# Patient Record
Sex: MALE | Race: WHITE | NOT HISPANIC OR LATINO | URBAN - METROPOLITAN AREA
[De-identification: names, ages, dates, MRNs, and addresses within clinical notes are randomized per-mention and may not be internally consistent; named-entity substitution may affect disease eponyms.]

---

## 2017-02-17 ENCOUNTER — OUTPATIENT (OUTPATIENT)
Dept: OUTPATIENT SERVICES | Facility: HOSPITAL | Age: 32
LOS: 1 days | Discharge: HOME | End: 2017-02-17

## 2017-06-22 ENCOUNTER — OUTPATIENT (OUTPATIENT)
Dept: OUTPATIENT SERVICES | Facility: HOSPITAL | Age: 32
LOS: 1 days | Discharge: HOME | End: 2017-06-22

## 2017-06-27 DIAGNOSIS — F31.73 BIPOLAR DISORDER, IN PARTIAL REMISSION, MOST RECENT EPISODE MANIC: ICD-10-CM

## 2017-06-28 DIAGNOSIS — F31.73 BIPOLAR DISORDER, IN PARTIAL REMISSION, MOST RECENT EPISODE MANIC: ICD-10-CM

## 2017-07-24 ENCOUNTER — OUTPATIENT (OUTPATIENT)
Dept: OUTPATIENT SERVICES | Facility: HOSPITAL | Age: 32
LOS: 1 days | Discharge: HOME | End: 2017-07-24

## 2017-07-24 DIAGNOSIS — F31.73 BIPOLAR DISORDER, IN PARTIAL REMISSION, MOST RECENT EPISODE MANIC: ICD-10-CM

## 2017-08-14 ENCOUNTER — OUTPATIENT (OUTPATIENT)
Dept: OUTPATIENT SERVICES | Facility: HOSPITAL | Age: 32
LOS: 1 days | Discharge: HOME | End: 2017-08-14

## 2017-08-14 DIAGNOSIS — F31.73 BIPOLAR DISORDER, IN PARTIAL REMISSION, MOST RECENT EPISODE MANIC: ICD-10-CM

## 2017-12-14 ENCOUNTER — OUTPATIENT (OUTPATIENT)
Dept: OUTPATIENT SERVICES | Facility: HOSPITAL | Age: 32
LOS: 1 days | Discharge: HOME | End: 2017-12-14

## 2017-12-14 DIAGNOSIS — F31.73 BIPOLAR DISORDER, IN PARTIAL REMISSION, MOST RECENT EPISODE MANIC: ICD-10-CM

## 2018-12-19 ENCOUNTER — OUTPATIENT (OUTPATIENT)
Dept: OUTPATIENT SERVICES | Facility: HOSPITAL | Age: 33
LOS: 1 days | Discharge: HOME | End: 2018-12-19

## 2018-12-20 DIAGNOSIS — F31.73 BIPOLAR DISORDER, IN PARTIAL REMISSION, MOST RECENT EPISODE MANIC: ICD-10-CM

## 2019-01-17 ENCOUNTER — OUTPATIENT (OUTPATIENT)
Dept: OUTPATIENT SERVICES | Facility: HOSPITAL | Age: 34
LOS: 1 days | Discharge: HOME | End: 2019-01-17

## 2019-01-19 DIAGNOSIS — F11.20 OPIOID DEPENDENCE, UNCOMPLICATED: ICD-10-CM

## 2019-02-15 ENCOUNTER — OUTPATIENT (OUTPATIENT)
Dept: OUTPATIENT SERVICES | Facility: HOSPITAL | Age: 34
LOS: 1 days | Discharge: HOME | End: 2019-02-15

## 2019-02-15 DIAGNOSIS — F31.73 BIPOLAR DISORDER, IN PARTIAL REMISSION, MOST RECENT EPISODE MANIC: ICD-10-CM

## 2019-04-18 ENCOUNTER — OUTPATIENT (OUTPATIENT)
Dept: OUTPATIENT SERVICES | Facility: HOSPITAL | Age: 34
LOS: 1 days | Discharge: HOME | End: 2019-04-18

## 2019-04-19 DIAGNOSIS — F31.73 BIPOLAR DISORDER, IN PARTIAL REMISSION, MOST RECENT EPISODE MANIC: ICD-10-CM

## 2019-09-13 ENCOUNTER — OUTPATIENT (OUTPATIENT)
Dept: OUTPATIENT SERVICES | Facility: HOSPITAL | Age: 34
LOS: 1 days | Discharge: HOME | End: 2019-09-13

## 2019-09-13 DIAGNOSIS — F31.73 BIPOLAR DISORDER, IN PARTIAL REMISSION, MOST RECENT EPISODE MANIC: ICD-10-CM

## 2019-11-15 ENCOUNTER — OUTPATIENT (OUTPATIENT)
Dept: OUTPATIENT SERVICES | Facility: HOSPITAL | Age: 34
LOS: 1 days | Discharge: HOME | End: 2019-11-15

## 2019-11-15 DIAGNOSIS — F31.73 BIPOLAR DISORDER, IN PARTIAL REMISSION, MOST RECENT EPISODE MANIC: ICD-10-CM

## 2021-04-27 ENCOUNTER — INPATIENT (INPATIENT)
Facility: HOSPITAL | Age: 36
LOS: 3 days | Discharge: PSYCHIATRIC OR SUBSTANCE USE FACILITY | End: 2021-05-01
Attending: FAMILY MEDICINE | Admitting: FAMILY MEDICINE
Payer: MEDICAID

## 2021-04-27 VITALS
TEMPERATURE: 98 F | SYSTOLIC BLOOD PRESSURE: 152 MMHG | WEIGHT: 169.98 LBS | RESPIRATION RATE: 18 BRPM | HEART RATE: 89 BPM | OXYGEN SATURATION: 98 % | HEIGHT: 70 IN | DIASTOLIC BLOOD PRESSURE: 71 MMHG

## 2021-04-27 DIAGNOSIS — F20.9 SCHIZOPHRENIA, UNSPECIFIED: ICD-10-CM

## 2021-04-27 LAB
ALBUMIN SERPL ELPH-MCNC: 4.9 G/DL — SIGNIFICANT CHANGE UP (ref 3.5–5.2)
ALP SERPL-CCNC: 70 U/L — SIGNIFICANT CHANGE UP (ref 30–115)
ALT FLD-CCNC: 15 U/L — SIGNIFICANT CHANGE UP (ref 0–41)
AMPHET UR-MCNC: NEGATIVE — SIGNIFICANT CHANGE UP
ANION GAP SERPL CALC-SCNC: 10 MMOL/L — SIGNIFICANT CHANGE UP (ref 7–14)
APAP SERPL-MCNC: <5 UG/ML — LOW (ref 10–30)
AST SERPL-CCNC: 16 U/L — SIGNIFICANT CHANGE UP (ref 0–41)
BARBITURATES UR SCN-MCNC: NEGATIVE — SIGNIFICANT CHANGE UP
BASOPHILS # BLD AUTO: 0.04 K/UL — SIGNIFICANT CHANGE UP (ref 0–0.2)
BASOPHILS NFR BLD AUTO: 0.6 % — SIGNIFICANT CHANGE UP (ref 0–1)
BENZODIAZ UR-MCNC: NEGATIVE — SIGNIFICANT CHANGE UP
BILIRUB SERPL-MCNC: 0.6 MG/DL — SIGNIFICANT CHANGE UP (ref 0.2–1.2)
BUN SERPL-MCNC: 17 MG/DL — SIGNIFICANT CHANGE UP (ref 10–20)
CALCIUM SERPL-MCNC: 9.8 MG/DL — SIGNIFICANT CHANGE UP (ref 8.5–10.1)
CHLORIDE SERPL-SCNC: 99 MMOL/L — SIGNIFICANT CHANGE UP (ref 98–110)
CO2 SERPL-SCNC: 28 MMOL/L — SIGNIFICANT CHANGE UP (ref 17–32)
COCAINE METAB.OTHER UR-MCNC: NEGATIVE — SIGNIFICANT CHANGE UP
CREAT SERPL-MCNC: 0.7 MG/DL — SIGNIFICANT CHANGE UP (ref 0.7–1.5)
EOSINOPHIL # BLD AUTO: 0.01 K/UL — SIGNIFICANT CHANGE UP (ref 0–0.7)
EOSINOPHIL NFR BLD AUTO: 0.2 % — SIGNIFICANT CHANGE UP (ref 0–8)
ETHANOL SERPL-MCNC: <10 MG/DL — SIGNIFICANT CHANGE UP
GLUCOSE SERPL-MCNC: 101 MG/DL — HIGH (ref 70–99)
HCT VFR BLD CALC: 37.1 % — LOW (ref 42–52)
HGB BLD-MCNC: 12.7 G/DL — LOW (ref 14–18)
IMM GRANULOCYTES NFR BLD AUTO: 0.2 % — SIGNIFICANT CHANGE UP (ref 0.1–0.3)
LYMPHOCYTES # BLD AUTO: 1.14 K/UL — LOW (ref 1.2–3.4)
LYMPHOCYTES # BLD AUTO: 17.9 % — LOW (ref 20.5–51.1)
MCHC RBC-ENTMCNC: 30.2 PG — SIGNIFICANT CHANGE UP (ref 27–31)
MCHC RBC-ENTMCNC: 34.2 G/DL — SIGNIFICANT CHANGE UP (ref 32–37)
MCV RBC AUTO: 88.1 FL — SIGNIFICANT CHANGE UP (ref 80–94)
METHADONE UR-MCNC: NEGATIVE — SIGNIFICANT CHANGE UP
MONOCYTES # BLD AUTO: 0.92 K/UL — HIGH (ref 0.1–0.6)
MONOCYTES NFR BLD AUTO: 14.4 % — HIGH (ref 1.7–9.3)
NEUTROPHILS # BLD AUTO: 4.26 K/UL — SIGNIFICANT CHANGE UP (ref 1.4–6.5)
NEUTROPHILS NFR BLD AUTO: 66.7 % — SIGNIFICANT CHANGE UP (ref 42.2–75.2)
NRBC # BLD: 0 /100 WBCS — SIGNIFICANT CHANGE UP (ref 0–0)
OPIATES UR-MCNC: NEGATIVE — SIGNIFICANT CHANGE UP
PCP SPEC-MCNC: SIGNIFICANT CHANGE UP
PLATELET # BLD AUTO: 285 K/UL — SIGNIFICANT CHANGE UP (ref 130–400)
POTASSIUM SERPL-MCNC: 4.2 MMOL/L — SIGNIFICANT CHANGE UP (ref 3.5–5)
POTASSIUM SERPL-SCNC: 4.2 MMOL/L — SIGNIFICANT CHANGE UP (ref 3.5–5)
PROPOXYPHENE QUALITATIVE URINE RESULT: NEGATIVE — SIGNIFICANT CHANGE UP
PROT SERPL-MCNC: 7.6 G/DL — SIGNIFICANT CHANGE UP (ref 6–8)
RAPID RVP RESULT: SIGNIFICANT CHANGE UP
RBC # BLD: 4.21 M/UL — LOW (ref 4.7–6.1)
RBC # FLD: 12.8 % — SIGNIFICANT CHANGE UP (ref 11.5–14.5)
SALICYLATES SERPL-MCNC: <0.3 MG/DL — LOW (ref 4–30)
SARS-COV-2 RNA SPEC QL NAA+PROBE: SIGNIFICANT CHANGE UP
SODIUM SERPL-SCNC: 137 MMOL/L — SIGNIFICANT CHANGE UP (ref 135–146)
WBC # BLD: 6.38 K/UL — SIGNIFICANT CHANGE UP (ref 4.8–10.8)
WBC # FLD AUTO: 6.38 K/UL — SIGNIFICANT CHANGE UP (ref 4.8–10.8)

## 2021-04-27 PROCEDURE — 99285 EMERGENCY DEPT VISIT HI MDM: CPT

## 2021-04-27 PROCEDURE — 90792 PSYCH DIAG EVAL W/MED SRVCS: CPT | Mod: 95

## 2021-04-27 NOTE — ED PROVIDER NOTE - OBJECTIVE STATEMENT
Patient is a 34 yo M w/ hx of schizophrenia, hx of noncompliance with medication on IM respirdal currently, multiple IPP admissions BIBEMS from home for AMS. Per mom, patient has become mute, distracted, not focusing, not sleeping x 2 days. Had similar episodes in past 2/2 to schizophrenia. Patient has been doing well since discharge 2 weeks prior, however 2 days ago started to act weird again.

## 2021-04-27 NOTE — ED PROVIDER NOTE - CLINICAL SUMMARY MEDICAL DECISION MAKING FREE TEXT BOX
after consultation with robinson advised to admit to medical service for further evaluation discussed with hospitalist service who had an independent conversation with telepsych and instructed to admit for further workup at this time. patient admitted

## 2021-04-27 NOTE — ED PROVIDER NOTE - PHYSICAL EXAMINATION
CONSTITUTIONAL: Well-developed; well-nourished; in no acute distress.   SKIN: warm, dry.  HEAD: Normocephalic; atraumatic.  EYES: PERRL, EOMI, no conjunctival erythema.  ENT: No nasal discharge; airway clear.  NECK: Supple; non tender.  CARD: S1, S2 normal; no murmurs, gallops, or rubs. Regular rate and rhythm.   RESP: No wheezes, rales or rhonchi.  ABD: soft ntnd  EXT: Normal ROM.  No clubbing, cyanosis or edema.   NEURO: Alert, moving all four extremities. mute.   PSYCH: Cooperative,  not making eye contact; almost catatonic.

## 2021-04-27 NOTE — ED BEHAVIORAL HEALTH ASSESSMENT NOTE - CURRENT MEDICATION
per [facility] ([phone]):  bupropion  mg daily (filled 4/22/21)  olanzapine 20 mg qhs (filled 3/10/21)  gabapentin 300 mg TID (filled 2/11/21)

## 2021-04-27 NOTE — ED PROVIDER NOTE - PROGRESS NOTE DETAILS
JR: spoke to psychiatry- they will add him to their list [place]-Pt signed out to me by Dr. Reyes pending psychiatric evaluation.  Pt seen by telepsych.  Plan to hold patient in the ED until the morning for re-evaluation.  Pts and mom are aware Pt  awaiting reeval by telepsych in morning -  Pt sleeping comfortably seen by Telepsych, at first recommended involuntary admission but soon after recommends admission to medicine for Neurologic workup, discussed case with Dr. Lowery with intention of admitting to medicine, but will speak with Psych himself and get back to me regarding dispo decision is now to Admit to medicine under Dr. Castillo fs: patient stable advised to admit to medical service at this time for further management

## 2021-04-27 NOTE — ED BEHAVIORAL HEALTH ASSESSMENT NOTE - SUMMARY
The patient is a 35-year-old male; domiciled with mother; employed as a ; self-reported PPHx of depression (per PSYCKES also schizoaffective disorder, bipolar disorder, anxiety), past admissions, denies hx of SIB/SA, hx of aggression; BIB EMS activated by parent; psychiatry consulted for psychosis.  Pt with speech latency and possible thought blocking though denies current SI/HI/AVH.  Pt's mother is concerned about pt and does not feel he is back to baseline but does not relay any specific safety concerns.  Pt to hold in ED for further observation and to obtain additional collateral from outpatient providers.

## 2021-04-27 NOTE — ED BEHAVIORAL HEALTH ASSESSMENT NOTE - DESCRIPTION
see BH note    COVID Exposure Screen- Patient  1.	*Have you had a COVID-19 test in the last 90 days?  ( x ) Yes, reportedly negative   (  ) No   (  ) Unknown- Reason: _____  2.	*Have you tested positive for COVID-19 antibodies? (  ) Yes   ( x ) No   (  ) Unknown- Reason: _____  3.	*Have you received 2 doses of the COVID-19 vaccine? (  ) Yes   ( x ) No   (  ) Unknown- Reason: _____  4.	*In the past 10 days, have you been around anyone with a positive COVID-19 test?* (  ) Yes   ( x ) No   (  ) Unknown- Reason: ____  5.	*Have you been out of [place] within the past 10 days?* (  ) Yes   ( x ) No   (  ) Unknown- Reason: _____    COVID Exposure Screen- collateral (i.e. third-party, chart review, belongings, etc; include EMS and ED staff)  1.	*Has the patient had a COVID-19 test in the last 90 days?  ( x ) Yes, reportedly negative   (  ) No   (  ) Unknown- Reason: _____  2.	*Has the patient tested positive for COVID-19 antibodies? (  ) Yes   (  ) No   ( x ) Unknown- Reason: uncertain  3.	*Has the patient received 2 doses of the COVID-19 vaccine? (  ) Yes   ( x ) No   (  ) Unknown- Reason: _____  4.	*In the past 10 days, has the patient been around anyone with a positive COVID-19 test?* (  ) Yes   ( x ) No   (  ) Unknown- Reason: __  5.	*Has the patient been out of [place] within the past 10 days?* (  ) Yes   ( x ) No   (  ) Unknown- Reason: _____ denies lives with mother in [place], works as a

## 2021-04-27 NOTE — ED ADULT NURSE NOTE - HPI (INCLUDE ILLNESS QUALITY, SEVERITY, DURATION, TIMING, CONTEXT, MODIFYING FACTORS, ASSOCIATED SIGNS AND SYMPTOMS)
EMS was called by patient's family member who stated to ems that pt has not been speaking x 2 days, decreased attentions, previous history of IPP admissions, on arrival patient is awake, calm, cooperative but not answering questions and not making eye contact.

## 2021-04-27 NOTE — ED BEHAVIORAL HEALTH ASSESSMENT NOTE - RISK ASSESSMENT
risk factors: male, single, psych dx, past admissions  protective factors: domiciled, engaged in outpatient tx, denies SI, no hx SIB/SA, denies access to guns/weapons Low Acute Suicide Risk

## 2021-04-27 NOTE — ED BEHAVIORAL HEALTH ASSESSMENT NOTE - HPI (INCLUDE ILLNESS QUALITY, SEVERITY, DURATION, TIMING, CONTEXT, MODIFYING FACTORS, ASSOCIATED SIGNS AND SYMPTOMS)
The patient is a 35-year-old male; domiciled with mother; employed as a ; self-reported PPHx of depression (per PSYCKES also schizoaffective disorder, bipolar disorder, anxiety), past admissions, denies hx of SIB/SA, hx of aggression; BIB EMS activated by parent; psychiatry consulted for psychosis.  Pt states that he was hearing stuff earlier but he "can't tell me too much about them."  He states the voices are male and female but he doesn't know where they are coming from.  He states that they say "good and bad things" but can't elaborate or provide examples.  At times pt with significant speech latency and when asked about it, he states that he is "blacking out, in a deep stare."  Pt reports his mood is "good, could be better," sleep is always poor (5 hours), reports having normal energy, denies anhedonia, reports eating all day (estimates 10 lb weight gain in past few weeks), denies SI/HI.  When asked for collateral contact numbers (psychiatrist and mother), he asks if he has to tell me because he worries for their protection but can't elaborate why.  He then states he will tell me their numbers and then says, "on second thought, I can't."  Pt states he takes 1 pill daily and has been compliant.    Collateral obtained from pt's mother.  she states that pt was hospitalized for about 3 weeks.  He was hearing voices at that time telling him not to eat and he had subsequently lost 20-30 lbs.  Since he was discharged, she has been trying to supplement his diet with electrolyte/nutritional drinks.  She believes he has gained some weight back but pt will not weigh himself.  They saw pt's psychiatrist on Wednesday and he mentioned how he "zones out" and pt's psychiatrist was not overly concerned about this and told collateral that his brain is still healing though she would monitor it with 1-2 more tests (collateral uncertain which tests).  Pt was also started on a risperdal injection but is not due again until May.  She states that pt returned to work for the first time yesterday (after ~6 weeks off).  He works with his brother and the day seemed to go OK but after returning home, he had an acute change.  He "wasn't talking, wasn't listening, wasn't alert, was pacing back and forth, didn't sleep, seemed disoriented, in a trance, zoned out."  She notes that pt was diagnosed with schizophrenia ~10 years ago and had head imaging done a long time ago.  She denies that he has any hx of SI/SA or violence.  Pt has no PMHx, keeps up with ADLs meticulously, no access to guns/weapons.  She states that pt used substances years ago (?percocet for pain) and she believes that is how this all started.  She identifies recent losses (pt's father  last year, then grandmother , then uncle had an accident and ).  There is no known FHx of mental illness.  Pt in outpatient tx at [facility] with "Dr. Mills" and therapist "Son."    Writer attempted to reach outpatient providers but clinic current closed ([phone]).
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
## 2021-04-28 DIAGNOSIS — F06.1 CATATONIC DISORDER DUE TO KNOWN PHYSIOLOGICAL CONDITION: ICD-10-CM

## 2021-04-28 DIAGNOSIS — F41.9 ANXIETY DISORDER, UNSPECIFIED: ICD-10-CM

## 2021-04-28 PROCEDURE — 99232 SBSQ HOSP IP/OBS MODERATE 35: CPT

## 2021-04-28 PROCEDURE — 99213 OFFICE O/P EST LOW 20 MIN: CPT | Mod: 95

## 2021-04-28 PROCEDURE — 99222 1ST HOSP IP/OBS MODERATE 55: CPT

## 2021-04-28 RX ORDER — BUPROPION HYDROCHLORIDE 150 MG/1
150 TABLET, EXTENDED RELEASE ORAL DAILY
Refills: 0 | Status: DISCONTINUED | OUTPATIENT
Start: 2021-04-28 | End: 2021-04-28

## 2021-04-28 RX ORDER — CHLORHEXIDINE GLUCONATE 213 G/1000ML
1 SOLUTION TOPICAL
Refills: 0 | Status: DISCONTINUED | OUTPATIENT
Start: 2021-04-28 | End: 2021-05-01

## 2021-04-28 RX ORDER — BUPROPION HYDROCHLORIDE 150 MG/1
150 TABLET, EXTENDED RELEASE ORAL ONCE
Refills: 0 | Status: COMPLETED | OUTPATIENT
Start: 2021-04-28 | End: 2021-04-28

## 2021-04-28 RX ORDER — GABAPENTIN 400 MG/1
1 CAPSULE ORAL
Qty: 0 | Refills: 0 | DISCHARGE

## 2021-04-28 RX ORDER — BUPROPION HYDROCHLORIDE 150 MG/1
300 TABLET, EXTENDED RELEASE ORAL DAILY
Refills: 0 | Status: DISCONTINUED | OUTPATIENT
Start: 2021-04-29 | End: 2021-04-29

## 2021-04-28 RX ADMIN — BUPROPION HYDROCHLORIDE 150 MILLIGRAM(S): 150 TABLET, EXTENDED RELEASE ORAL at 19:13

## 2021-04-28 NOTE — H&P ADULT - NSHPPHYSICALEXAM_GEN_ALL_CORE
VITALS:     ICU Vital Signs Last 24 Hrs  T(C): 36.3 (28 Apr 2021 09:08), Max: 36.7 (28 Apr 2021 06:03)  T(F): 97.4 (28 Apr 2021 09:08), Max: 98.1 (28 Apr 2021 06:03)  HR: 92 (28 Apr 2021 09:08) (87 - 96)  BP: 121/98 (28 Apr 2021 09:08) (121/98 - 155/72)  RR: 18 (28 Apr 2021 09:08) (16 - 18)  SpO2: 98% (28 Apr 2021 09:08) (98% - 98%)    VITALS:     GENERAL: NAD, lying in watching TV   HEAD:  Atraumatic, Normocephalic  EYES: EOMI, PERRLA, conjunctiva and sclera clear, no horizontal nystagmus, peripheral field of vision intact.   ENT: Moist mucous membranes  NECK: Supple, No JVD  CHEST/LUNG: Clear to auscultation bilaterally; No rales, rhonchi, wheezing, or rubs. Unlabored respirations  HEART: Regular rate and rhythm; No murmurs, rubs, or gallops  ABDOMEN: Soft, Nontender, Nondistended. No hepatomegally  EXTREMITIES:  no edema or tenderness   NERVOUS SYSTEM:  Alert & Oriented X3, speech clear. No deficits   MSK: FROM all 4 extremities, full and equal strength, reflex 2+ in upper and lower extremity   SKIN: No rashes or lesions

## 2021-04-28 NOTE — H&P ADULT - ATTENDING COMMENTS
Agree with above. Pt had a full workup done at [facility]. attempted to get records but medical records office was closed.    No infectious etio of concern. Would like to avoid repeating CTH, vEEG if previous records can be found.    Jenna Hood MD  Attending Hospitalist

## 2021-04-28 NOTE — H&P ADULT - PROBLEM SELECTOR PLAN 2
-seen by psychiatry will continue Wellbutrin at lower dose 150 XL, will re-consult to follow up on the floor   -pt on Invega Sustenna 234mg IM, next due for next dose May 7  -constant observation

## 2021-04-28 NOTE — H&P ADULT - ASSESSMENT
A 36 y/o male with pmhx of anxiety, depression, schizophrenia and bipolar disorder on on Wellbutrin XL 300mg daily and Invega Sustenna 234mg IM  brought to ED for AMS. PT admitted for catatonia vs abstained seizure.

## 2021-04-28 NOTE — H&P ADULT - NSHPLABSRESULTS_GEN_ALL_CORE
12.7   6.38  )-----------( 285      ( 27 Apr 2021 14:37 )             37.1       04-27    137  |  99  |  17  ----------------------------<  101<H>  4.2   |  28  |  0.7    Ca    9.8      27 Apr 2021 14:37    TPro  7.6  /  Alb  4.9  /  TBili  0.6  /  DBili  x   /  AST  16  /  ALT  15  /  AlkPhos  70  04-27          Lactate Trend

## 2021-04-28 NOTE — H&P ADULT - PROBLEM SELECTOR PLAN 1
r/o seizure   -VEEG  -neurology consult   -check  ammonia level, copper, Iron , ck level, procalcitonin

## 2021-04-28 NOTE — PROGRESS NOTE BEHAVIORAL HEALTH - OTHER
frequent blinking not formerly tested defer difficult to assess given impoverished and limited answers

## 2021-04-28 NOTE — PROGRESS NOTE BEHAVIORAL HEALTH - NSBHADMITCOUNSEL_PSY_A_CORE
diagnostic results/impressions and/or recommended studies/risks and benefits of treatment options/client/family/caregiver education

## 2021-04-28 NOTE — H&P ADULT - HISTORY OF PRESENT ILLNESS
A 34 y/o male with pmhx of anxiety, depression, schizophrenia and bipolar disorder brought to ED for AMS.  Pt recently discharged from [facility] after being treated there for agitation, and auditory hallucination and since Monday pt has been experiencing zoning out,  for few minutes, pt keeps starring, not responding or gets agitated. Pt reports this episodes comes all of the sudden. Pt denies nausea, vomiting, floaters.  As per EMR brother Rommel has reports this sporadic events have happened in the past but were less severe. As per pt  outpatient psychiatrist, Dr. Mills. pt on Wellbutrin XL 300mg daily and Invega Sustenna 234mg IM, next due for next dose May 7. Pt reports used to take Gabapentin 300 mg TID for many years and stopped it on his own few months ago. Pt denies hx of seizure. PT denies visual or auditory hallucination at this time. Pt denies head injury. Pt denies suicidal or homicidal ideation. Pt denies fever, chills, chest pain, shortness of  breath, burning with urination, diarrhea.

## 2021-04-29 LAB
AMMONIA BLD-MCNC: 33 UMOL/L — SIGNIFICANT CHANGE UP (ref 11–55)
ANION GAP SERPL CALC-SCNC: 13 MMOL/L — SIGNIFICANT CHANGE UP (ref 7–14)
BUN SERPL-MCNC: 17 MG/DL — SIGNIFICANT CHANGE UP (ref 10–20)
CALCIUM SERPL-MCNC: 9.5 MG/DL — SIGNIFICANT CHANGE UP (ref 8.5–10.1)
CHLORIDE SERPL-SCNC: 101 MMOL/L — SIGNIFICANT CHANGE UP (ref 98–110)
CK SERPL-CCNC: 92 U/L — SIGNIFICANT CHANGE UP (ref 0–225)
CO2 SERPL-SCNC: 27 MMOL/L — SIGNIFICANT CHANGE UP (ref 17–32)
COVID-19 SPIKE DOMAIN AB INTERP: POSITIVE
COVID-19 SPIKE DOMAIN ANTIBODY RESULT: 49.2 U/ML — HIGH
CREAT SERPL-MCNC: 0.7 MG/DL — SIGNIFICANT CHANGE UP (ref 0.7–1.5)
GLUCOSE SERPL-MCNC: 106 MG/DL — HIGH (ref 70–99)
HCT VFR BLD CALC: 37.1 % — LOW (ref 42–52)
HGB BLD-MCNC: 12.5 G/DL — LOW (ref 14–18)
IRON SATN MFR SERPL: 34 % — SIGNIFICANT CHANGE UP (ref 15–50)
IRON SATN MFR SERPL: 95 UG/DL — SIGNIFICANT CHANGE UP (ref 35–150)
MCHC RBC-ENTMCNC: 29.6 PG — SIGNIFICANT CHANGE UP (ref 27–31)
MCHC RBC-ENTMCNC: 33.7 G/DL — SIGNIFICANT CHANGE UP (ref 32–37)
MCV RBC AUTO: 87.7 FL — SIGNIFICANT CHANGE UP (ref 80–94)
NRBC # BLD: 0 /100 WBCS — SIGNIFICANT CHANGE UP (ref 0–0)
PLATELET # BLD AUTO: 268 K/UL — SIGNIFICANT CHANGE UP (ref 130–400)
POTASSIUM SERPL-MCNC: 4.3 MMOL/L — SIGNIFICANT CHANGE UP (ref 3.5–5)
POTASSIUM SERPL-SCNC: 4.3 MMOL/L — SIGNIFICANT CHANGE UP (ref 3.5–5)
RBC # BLD: 4.23 M/UL — LOW (ref 4.7–6.1)
RBC # FLD: 12.6 % — SIGNIFICANT CHANGE UP (ref 11.5–14.5)
SARS-COV-2 IGG+IGM SERPL QL IA: 49.2 U/ML — HIGH
SARS-COV-2 IGG+IGM SERPL QL IA: POSITIVE
SODIUM SERPL-SCNC: 141 MMOL/L — SIGNIFICANT CHANGE UP (ref 135–146)
TIBC SERPL-MCNC: 283 UG/DL — SIGNIFICANT CHANGE UP (ref 220–430)
UIBC SERPL-MCNC: 188 UG/DL — SIGNIFICANT CHANGE UP (ref 110–370)
WBC # BLD: 5.2 K/UL — SIGNIFICANT CHANGE UP (ref 4.8–10.8)
WBC # FLD AUTO: 5.2 K/UL — SIGNIFICANT CHANGE UP (ref 4.8–10.8)

## 2021-04-29 PROCEDURE — 99232 SBSQ HOSP IP/OBS MODERATE 35: CPT

## 2021-04-29 PROCEDURE — 99222 1ST HOSP IP/OBS MODERATE 55: CPT

## 2021-04-29 PROCEDURE — 99231 SBSQ HOSP IP/OBS SF/LOW 25: CPT | Mod: GC

## 2021-04-29 RX ORDER — ENOXAPARIN SODIUM 100 MG/ML
30 INJECTION SUBCUTANEOUS DAILY
Refills: 0 | Status: DISCONTINUED | OUTPATIENT
Start: 2021-04-29 | End: 2021-04-29

## 2021-04-29 RX ORDER — ENOXAPARIN SODIUM 100 MG/ML
40 INJECTION SUBCUTANEOUS DAILY
Refills: 0 | Status: DISCONTINUED | OUTPATIENT
Start: 2021-04-29 | End: 2021-05-01

## 2021-04-29 RX ORDER — BUPROPION HYDROCHLORIDE 150 MG/1
150 TABLET, EXTENDED RELEASE ORAL DAILY
Refills: 0 | Status: DISCONTINUED | OUTPATIENT
Start: 2021-04-29 | End: 2021-04-29

## 2021-04-29 RX ORDER — BACITRACIN ZINC 500 UNIT/G
1 OINTMENT IN PACKET (EA) TOPICAL
Refills: 0 | Status: DISCONTINUED | OUTPATIENT
Start: 2021-04-29 | End: 2021-05-01

## 2021-04-29 RX ORDER — OLANZAPINE 15 MG/1
20 TABLET, FILM COATED ORAL DAILY
Refills: 0 | Status: DISCONTINUED | OUTPATIENT
Start: 2021-04-29 | End: 2021-05-01

## 2021-04-29 RX ADMIN — ENOXAPARIN SODIUM 40 MILLIGRAM(S): 100 INJECTION SUBCUTANEOUS at 18:02

## 2021-04-29 RX ADMIN — BUPROPION HYDROCHLORIDE 150 MILLIGRAM(S): 150 TABLET, EXTENDED RELEASE ORAL at 00:51

## 2021-04-29 RX ADMIN — OLANZAPINE 20 MILLIGRAM(S): 15 TABLET, FILM COATED ORAL at 15:13

## 2021-04-29 NOTE — CONSULT NOTE ADULT - SUBJECTIVE AND OBJECTIVE BOX
Neurology Consult note    Name  PHYLLIS XIAO    HPI:  A 34 y/o male with pmhx of anxiety, depression, schizophrenia and bipolar disorder brought to ED for AMS.  Pt recently discharged from [facility] after being treated there for agitation, and auditory hallucination and since Monday pt has been experiencing zoning out,  for few minutes, pt keeps starring, not responding or gets agitated. Pt reports this episodes comes all of the sudden. Pt denies nausea, vomiting, floaters.  As per EMR brother Rommel has reports this sporadic events have happened in the past but were less severe. As per pt  outpatient psychiatrist, Dr. Mills. pt on Wellbutrin XL 300mg daily and Invega Sustenna 234mg IM, next due for next dose May 7. Pt reports used to take Gabapentin 300 mg TID for many years and stopped it on his own few months ago. Pt denies hx of seizure. PT denies visual or auditory hallucination at this time. Pt denies head injury. Pt denies suicidal or homicidal ideation. Pt denies fever, chills, chest pain, shortness of  breath, burning with urination, diarrhea.    (28 Apr 2021 17:03)      Neurology Interval History   -pt keeps starring to TV, not responding to verbal command. After calling his name few times pt responds "hey whats up" and continues to watch TV. PT seen and examined yesterday, yesterday was cooperative responding to questions.     Vital Signs Last 24 Hrs  T(C): 35.7 (29 Apr 2021 04:45), Max: 36.7 (28 Apr 2021 19:11)  T(F): 96.2 (29 Apr 2021 04:45), Max: 98.1 (28 Apr 2021 19:11)  HR: 79 (29 Apr 2021 04:45) (79 - 98)  BP: 108/67 (29 Apr 2021 04:45) (108/67 - 162/95)  RR: 16 (29 Apr 2021 04:45) (16 - 16)  SpO2: 99% (28 Apr 2021 21:50) (99% - 100%)    Neurological Exam:   Mental status: Awake, keeps starring to TV, responded only once to verbal command. Does not responds to threat.   Cranial nerves:  pupils equally round and reactive to light.   Motor:  Normal bulk and tone,  when hand is dropped to pt face he avoids hitting his face.   Responds to painful stimulus, his lower lip tremors, but does not withdraw.    unable to perform rest of exam       Medications  BACItracin   Ointment 1 Application(s) Topical two times a day  chlorhexidine 4% Liquid 1 Application(s) Topical <User Schedule>  OLANZapine Disintegrating Tablet 20 milliGRAM(s) Oral daily      Lab                        12.5   5.20  )-----------( 268      ( 29 Apr 2021 06:30 )             37.1     04-29    141  |  101  |  17  ----------------------------<  106<H>  4.3   |  27  |  0.7    Ca    9.5      29 Apr 2021 06:30    TPro  7.6  /  Alb  4.9  /  TBili  0.6  /  DBili  x   /  AST  16  /  ALT  15  /  AlkPhos  70  04-27    CAPILLARY BLOOD GLUCOSE        LIVER FUNCTIONS - ( 27 Apr 2021 14:37 )  Alb: 4.9 g/dL / Pro: 7.6 g/dL / ALK PHOS: 70 U/L / ALT: 15 U/L / AST: 16 U/L / GGT: x

## 2021-04-29 NOTE — CONSULT NOTE ADULT - ATTENDING COMMENTS
I have personally seen and examined this patient.  I have fully participated in the care of this patient.  I have reviewed all pertinent clinical information, including history, physical exam, plan and note.   I have reviewed all pertinent clinical information and reviewed all relevant imaging and diagnostic studies personally.  Pt awake and alert but no communicating or interacting.  Able to feed himself but refuses to cooperate.  Doubt seizure since responding to visual threat.  Suspect catatonic state secondary to psychiatric issues.  Recommend REEG and if negative, no further neurologic w/u.

## 2021-04-29 NOTE — PROGRESS NOTE BEHAVIORAL HEALTH - NSBHCHARTREVIEWVS_PSY_A_CORE FT
ICU Vital Signs Last 24 Hrs  T(C): 35.7 (29 Apr 2021 04:45), Max: 36.7 (28 Apr 2021 19:11)  T(F): 96.2 (29 Apr 2021 04:45), Max: 98.1 (28 Apr 2021 19:11)  HR: 79 (29 Apr 2021 04:45) (79 - 98)  BP: 108/67 (29 Apr 2021 04:45) (108/67 - 162/95)  RR: 16 (29 Apr 2021 04:45) (16 - 16)  SpO2: 99% (28 Apr 2021 21:50) (99% - 100%)

## 2021-04-29 NOTE — PROGRESS NOTE ADULT - SUBJECTIVE AND OBJECTIVE BOX
Patient is a 35y old  Male who presents with a chief complaint of catatonia r/o seizure (29 Apr 2021 12:23)      SUBJECTIVE / OVERNIGHT EVENTS: Stopped Wellbutrin given it lowers the threshold of seizures. Pt answers questions with a few pauses and excessive blinking. Spoke to psychiatry and they recommended the pt be admitted to  psych. They confer his symptoms are 2/2 to schizophrenia rather than seizures. VEEG being done. Pt started on olanzapine 20mg qd. Pt had workup done at [facility] and said they did not start any medications after that visit. Pt is aware of the episodes when they occur. Denies any urine or fecal incontinence or any weakness.       ADDITIONAL REVIEW OF SYSTEMS:  CONSTITUTIONAL: No fever, weight loss  RESPIRATORY: No SOB. No cough, wheezing, chills or hemoptysis  CARDIOVASCULAR: No chest pain, palpitations, dizziness, or leg swelling  GASTROINTESTINAL: No abdominal or epigastric pain. No nausea, vomiting, or hematemesis; No diarrhea or constipation. No melena or hematochezia.  GENITOURINARY: No dysuria, frequency, hematuria, or incontinence  NEUROLOGICAL: No headaches, memory loss, loss of strength, numbness, or tremors   ENDOCRINE: No heat or cold intolerance; No hair loss  MUSCULOSKELETAL: No joint pain or swelling; No muscle, back pain  PSYCHIATRIC: Pauses and blank episodes        MEDICATIONS  (STANDING):  BACItracin   Ointment 1 Application(s) Topical two times a day  chlorhexidine 4% Liquid 1 Application(s) Topical <User Schedule>  OLANZapine Disintegrating Tablet 20 milliGRAM(s) Oral daily    MEDICATIONS  (PRN):      CAPILLARY BLOOD GLUCOSE        I&O's Summary      PHYSICAL EXAM:  Vital Signs Last 24 Hrs  T(C): 35.7 (29 Apr 2021 04:45), Max: 36.7 (28 Apr 2021 19:11)  T(F): 96.2 (29 Apr 2021 04:45), Max: 98.1 (28 Apr 2021 19:11)  HR: 79 (29 Apr 2021 04:45) (79 - 98)  BP: 108/67 (29 Apr 2021 04:45) (108/67 - 162/95)  BP(mean): --  RR: 16 (29 Apr 2021 04:45) (16 - 16)  SpO2: 99% (28 Apr 2021 21:50) (99% - 100%)  CONSTITUTIONAL: NAD, well-developed, well-groomed  EYES: PERRLA; conjunctiva and sclera clear  ENMT: Moist oral mucosa, no pharyngeal injection or exudates; normal dentition  NECK: Supple, no palpable masses; no thyromegaly  RESPIRATORY: Normal respiratory effort; lungs are clear to auscultation bilaterally  CARDIOVASCULAR: Regular rate and rhythm, normal S1 and S2, no murmur/rub/gallop; No lower extremity edema; Peripheral pulses are 2+ bilaterally  ABDOMEN: Nontender to palpation, normoactive bowel sounds, no rebound/guarding; No hepatosplenomegaly  MUSCULOSKELETAL:  Normal gait; no clubbing or cyanosis of digits; no joint swelling or tenderness to palpation  PSYCH: A+O to person, place, and time; affect appropriate  NEUROLOGY: CN 2-12 are intact and symmetric; no gross sensory deficits; equal motor strength 5/5 in b/l UE and LE      LABS:                        12.5   5.20  )-----------( 268      ( 29 Apr 2021 06:30 )             37.1     04-29    141  |  101  |  17  ----------------------------<  106<H>  4.3   |  27  |  0.7    Ca    9.5      29 Apr 2021 06:30    TPro  7.6  /  Alb  4.9  /  TBili  0.6  /  DBili  x   /  AST  16  /  ALT  15  /  AlkPhos  70  04-27      CARDIAC MARKERS ( 29 Apr 2021 06:30 )  x     / x     / 92 U/L / x     / x                RADIOLOGY & ADDITIONAL TESTS:  Results Reviewed:   Imaging Personally Reviewed:  Electrocardiogram Personally Reviewed:    COORDINATION OF CARE:  Care Discussed with Consultants/Other Providers [Y/N]:  Prior or Outpatient Records Reviewed [Y/N]:

## 2021-04-29 NOTE — PROGRESS NOTE ADULT - ASSESSMENT
36 y/o male with pmhx of anxiety, depression, schizophrenia and bipolar disorder admitted for catatonic episodes.      #catatonia 2/2 to seizures vs schizophrenia  - VEEG  - stopped wellbutrin as it lowerss the seizure threshold  - started olanzapine 20mg qd  - psych recs appreciated  - neuro recs appreciated  - pt due for invega on 5/7/21  - psych recommended IP psych once vEEG is done      proph:  vte: lovenox  diet: regular  Dispo: IP psych

## 2021-04-29 NOTE — PROGRESS NOTE BEHAVIORAL HEALTH - CASE SUMMARY
Saw pt with the resident. Pt is 34yo M admitted to the medical floor to r/o seizure activity. He presents acutely psychotic, internally preoccupied, actively hallucinating during the interview, with increased speech latency, frequent blinking. Olanzapine 20mg daily initiated. Pt will likely need transfer to [facility] once medically cleared. Agree with resident's assessment. Saw pt with the resident. Pt is 36yo M admitted to the medical floor to r/o seizure activity. He presents acutely psychotic, internally preoccupied, actively hallucinating during the interview, with increased speech latency, frequent blinking. Olanzapine 20mg daily initiated. D/C bupropion as no clear indications at this time; it also decreases SZ threshold. Pt will likely need transfer to IP once medically cleared. Agree with resident's assessment.

## 2021-04-29 NOTE — PROGRESS NOTE BEHAVIORAL HEALTH - OTHER
patient appears internally preoccupied (with increased latency of speech onset) frequent blinking, no evidence of rigidity or stiffness; Full ROM on exam and strength intact. not formerly tested defer

## 2021-04-29 NOTE — PROGRESS NOTE BEHAVIORAL HEALTH - NSBHCHARTREVIEWLAB_PSY_A_CORE FT
12.5   5.20  )-----------( 268      ( 29 Apr 2021 06:30 )             37.1     04-29    141  |  101  |  17  ----------------------------<  106<H>  4.3   |  27  |  0.7    Ca    9.5      29 Apr 2021 06:30    TPro  7.6  /  Alb  4.9  /  TBili  0.6  /  DBili  x   /  AST  16  /  ALT  15  /  AlkPhos  70  04-27

## 2021-04-29 NOTE — PATIENT PROFILE ADULT - FUNCTIONAL SCREEN CURRENT LEVEL: COMMUNICATION, MLM
patient catatonic unable to answer/2 = difficulty understanding and speaking (not related to language barrier)

## 2021-04-29 NOTE — CONSULT NOTE ADULT - ASSESSMENT
A 34 y/o male with pmhx of anxiety, depression, schizophrenia and bipolar disorder brought to ED for AMS.  Pt recently discharged from [facility] after being treated there for agitation, and auditory hallucination and since Monday pt has been experiencing zoning out,  for few minutes, pt keeps starring, not responding. Yesterday in ED, pt was fully cooperative, AOx 3, today 4/29 pt keeps starring to TV, does not respond to threat to eyes, responds to pain, when hand drops to pt face he moves it away from the face. PT with possibly overmedicated, catatonia vs seizure    Plan    -REEG  -psychiatry follow up

## 2021-04-30 LAB
ANION GAP SERPL CALC-SCNC: 14 MMOL/L — SIGNIFICANT CHANGE UP (ref 7–14)
BUN SERPL-MCNC: 17 MG/DL — SIGNIFICANT CHANGE UP (ref 10–20)
CALCIUM SERPL-MCNC: 9.7 MG/DL — SIGNIFICANT CHANGE UP (ref 8.5–10.1)
CHLORIDE SERPL-SCNC: 102 MMOL/L — SIGNIFICANT CHANGE UP (ref 98–110)
CO2 SERPL-SCNC: 27 MMOL/L — SIGNIFICANT CHANGE UP (ref 17–32)
CREAT SERPL-MCNC: 0.9 MG/DL — SIGNIFICANT CHANGE UP (ref 0.7–1.5)
GLUCOSE SERPL-MCNC: 106 MG/DL — HIGH (ref 70–99)
HCT VFR BLD CALC: 38.6 % — LOW (ref 42–52)
HGB BLD-MCNC: 13 G/DL — LOW (ref 14–18)
MCHC RBC-ENTMCNC: 29.7 PG — SIGNIFICANT CHANGE UP (ref 27–31)
MCHC RBC-ENTMCNC: 33.7 G/DL — SIGNIFICANT CHANGE UP (ref 32–37)
MCV RBC AUTO: 88.1 FL — SIGNIFICANT CHANGE UP (ref 80–94)
NRBC # BLD: 0 /100 WBCS — SIGNIFICANT CHANGE UP (ref 0–0)
PLATELET # BLD AUTO: 285 K/UL — SIGNIFICANT CHANGE UP (ref 130–400)
POTASSIUM SERPL-MCNC: 4 MMOL/L — SIGNIFICANT CHANGE UP (ref 3.5–5)
POTASSIUM SERPL-SCNC: 4 MMOL/L — SIGNIFICANT CHANGE UP (ref 3.5–5)
PROCALCITONIN SERPL-MCNC: 0.05 NG/ML — SIGNIFICANT CHANGE UP (ref 0.02–0.1)
RAPID RVP RESULT: SIGNIFICANT CHANGE UP
RBC # BLD: 4.38 M/UL — LOW (ref 4.7–6.1)
RBC # FLD: 12.4 % — SIGNIFICANT CHANGE UP (ref 11.5–14.5)
SARS-COV-2 RNA SPEC QL NAA+PROBE: SIGNIFICANT CHANGE UP
SODIUM SERPL-SCNC: 143 MMOL/L — SIGNIFICANT CHANGE UP (ref 135–146)
WBC # BLD: 5.09 K/UL — SIGNIFICANT CHANGE UP (ref 4.8–10.8)
WBC # FLD AUTO: 5.09 K/UL — SIGNIFICANT CHANGE UP (ref 4.8–10.8)

## 2021-04-30 PROCEDURE — 99232 SBSQ HOSP IP/OBS MODERATE 35: CPT

## 2021-04-30 PROCEDURE — 99231 SBSQ HOSP IP/OBS SF/LOW 25: CPT

## 2021-04-30 PROCEDURE — 95819 EEG AWAKE AND ASLEEP: CPT | Mod: 26

## 2021-04-30 RX ADMIN — OLANZAPINE 20 MILLIGRAM(S): 15 TABLET, FILM COATED ORAL at 14:19

## 2021-04-30 NOTE — PROGRESS NOTE ADULT - SUBJECTIVE AND OBJECTIVE BOX
Neurology Follow up note    Name  PHYLLIS XIAO    HPI:  A 34 y/o male with pmhx of anxiety, depression, schizophrenia and bipolar disorder brought to ED for AMS.  Pt recently discharged from [facility] after being treated there for agitation, and auditory hallucination and since Monday pt has been experiencing zoning out,  for few minutes, pt keeps starring, not responding or gets agitated. Pt reports this episodes comes all of the sudden. Pt denies nausea, vomiting, floaters.  As per EMR brother Rommel has reports this sporadic events have happened in the past but were less severe. As per pt  outpatient psychiatrist, Dr. Mills. pt on Wellbutrin XL 300mg daily and Invega Sustenna 234mg IM, next due for next dose May 7. Pt reports used to take Gabapentin 300 mg TID for many years and stopped it on his own few months ago. Pt denies hx of seizure. PT denies visual or auditory hallucination at this time. Pt denies head injury. Pt denies suicidal or homicidal ideation. Pt denies fever, chills, chest pain, shortness of  breath, burning with urination, diarrhea.    (28 Apr 2021 17:03)      Neurology Interval History   - Pt AOx 3 now, cooperative, completely different from yesterday.  Physical exam no nystagmus or deficits. EEG shows just some generalized slowing and low amplitude frequency ,no seizure.       Vital Signs Last 24 Hrs  T(C): 36.1 (30 Apr 2021 05:07), Max: 36.1 (30 Apr 2021 05:07)  T(F): 97 (30 Apr 2021 05:07), Max: 97 (30 Apr 2021 05:07)  HR: 70 (30 Apr 2021 05:07) (70 - 83)  BP: 109/59 (30 Apr 2021 05:07) (109/59 - 130/80)  RR: 16 (30 Apr 2021 05:07) (16 - 16)    Neurological Exam:   Mental status: Awake, alert and oriented x3.  Recent and remote memory intact.  Naming, repetition and comprehension intact.  Attention/concentration intact.  No dysarthria, no aphasia.  Fund of knowledge appropriate.    Cranial nerves:  pupils equally round and reactive to light, visual fields full, no nystagmus, extraocular muscles intact,  face symmetric,  tongue was midline, sternocleidomastoid/shoulder shrug strength bilaterally 5/5.    Motor:  Normal bulk and tone, strength 5/5 in bilateral upper and lower extremities.   strength 5/5.   Sensation: Intact to light touch, proprioception, and pinprick.  No neglect.   Coordination: No dysmetria on finger-to-nose  Reflexes: 2+ in upper and lower extremities, downgoing toes bilaterally      Medications  BACItracin   Ointment 1 Application(s) Topical two times a day  chlorhexidine 4% Liquid 1 Application(s) Topical <User Schedule>  enoxaparin Injectable 40 milliGRAM(s) SubCutaneous daily  OLANZapine Disintegrating Tablet 20 milliGRAM(s) Oral daily      Lab                        13.0   5.09  )-----------( 285      ( 30 Apr 2021 06:15 )             38.6     04-29    141  |  101  |  17  ----------------------------<  106<H>  4.3   |  27  |  0.7    Ca    9.5      29 Apr 2021 06:30      < from: EEG (04.29.21 @ 12:00) >    EPILEPTIFORM ACTIVITY  No epileptiform activity      TYPE  As above  As above      EVENTS  Abnormal awake routine EEG due to the presence of moderate generalized slowing      IMPRESSION:  Abnormal routine EEG due to the presence of moderate generalized slowing and low amplitude      < end of copied text >     Neurology Follow up note    Name  PHYLLIS XIAO    HPI:  A 36 y/o male with pmhx of anxiety, depression, schizophrenia and bipolar disorder brought to ED for AMS.  Pt recently discharged from [facility] after being treated there for agitation, and auditory hallucination and since Monday pt has been experiencing zoning out,  for few minutes, pt keeps starring, not responding or gets agitated. Pt reports this episodes comes all of the sudden. Pt denies nausea, vomiting, floaters.  As per EMR brother Rommel has reports this sporadic events have happened in the past but were less severe. As per pt  outpatient psychiatrist, Dr. Mills. pt on Wellbutrin XL 300mg daily and Invega Sustenna 234mg IM, next due for next dose May 7. Pt reports used to take Gabapentin 300 mg TID for many years and stopped it on his own few months ago. Pt denies hx of seizure. PT denies visual or auditory hallucination at this time. Pt denies head injury. Pt denies suicidal or homicidal ideation. Pt denies fever, chills, chest pain, shortness of  breath, burning with urination, diarrhea.    (28 Apr 2021 17:03)      Neurology Interval History   - Pt AOx 3 now, cooperative, completely different from yesterday. Pt reports hearing voices, but can not tell us what they tell him.  Physical exam no nystagmus or deficits. EEG shows just some generalized slowing and low amplitude frequency ,no seizure.       Vital Signs Last 24 Hrs  T(C): 36.1 (30 Apr 2021 05:07), Max: 36.1 (30 Apr 2021 05:07)  T(F): 97 (30 Apr 2021 05:07), Max: 97 (30 Apr 2021 05:07)  HR: 70 (30 Apr 2021 05:07) (70 - 83)  BP: 109/59 (30 Apr 2021 05:07) (109/59 - 130/80)  RR: 16 (30 Apr 2021 05:07) (16 - 16)    Neurological Exam:   Mental status: Awake, alert and oriented x3.  Recent and remote memory intact.  Naming, repetition and comprehension intact.  Attention/concentration intact.  No dysarthria, no aphasia.  Fund of knowledge appropriate.    Cranial nerves:  pupils equally round and reactive to light, visual fields full, no nystagmus, extraocular muscles intact,  face symmetric,  tongue was midline, sternocleidomastoid/shoulder shrug strength bilaterally 5/5.    Motor:  Normal bulk and tone, strength 5/5 in bilateral upper and lower extremities.   strength 5/5.   Sensation: Intact to light touch, proprioception, and pinprick.  No neglect.   Coordination: No dysmetria on finger-to-nose  Reflexes: 2+ in upper and lower extremities, downgoing toes bilaterally      Medications  BACItracin   Ointment 1 Application(s) Topical two times a day  chlorhexidine 4% Liquid 1 Application(s) Topical <User Schedule>  enoxaparin Injectable 40 milliGRAM(s) SubCutaneous daily  OLANZapine Disintegrating Tablet 20 milliGRAM(s) Oral daily      Lab                        13.0   5.09  )-----------( 285      ( 30 Apr 2021 06:15 )             38.6     04-29    141  |  101  |  17  ----------------------------<  106<H>  4.3   |  27  |  0.7    Ca    9.5      29 Apr 2021 06:30      < from: EEG (04.29.21 @ 12:00) >    EPILEPTIFORM ACTIVITY  No epileptiform activity      TYPE  As above  As above      EVENTS  Abnormal awake routine EEG due to the presence of moderate generalized slowing      IMPRESSION:  Abnormal routine EEG due to the presence of moderate generalized slowing and low amplitude      < end of copied text >

## 2021-04-30 NOTE — PROGRESS NOTE BEHAVIORAL HEALTH - OTHER
patient appears internally preoccupied (with increased latency of speech onset) frequent blinking, no evidence of rigidity or stiffness; Full ROM on exam and strength intact. defer not formerly tested

## 2021-04-30 NOTE — PROGRESS NOTE BEHAVIORAL HEALTH - SUMMARY
34yo M, domiciled with mother; employed as a ; self-reported PPHx of depression (per PSYCKES also schizoaffective disorder, bipolar disorder, anxiety), past admissions, denies hx of SIB/SA, hx of aggression; BIB EMS activated by parent; psychiatry consulted for psychosis.    On evaluation today, patient presents as acutely psychotic. He appears internally preoccupied (leading to increased latency of speech onset), endorses AH while remaining very guarded. There may be a component of catatonia as patient continues to blink frequently throughout the interview, however, he has no objective signs of muscle stiffness/rigidity or immobility. Patient would most likely benefit from inpatient psychiatric hospitalization once medically cleared for stabilization and safety of psychotic symptoms.    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
36yo M, domiciled with mother; employed as a ; self-reported PPHx of depression (per PSYCKES also schizoaffective disorder, bipolar disorder, anxiety), past admissions, denies hx of SIB/SA, hx of aggression; BIB EMS activated by parent; psychiatry consulted for psychosis.      Although pt denies SI/HI/AVH, he presents with significant eye blinking, speech latency, thought blocking, all worsening according to family. Brother states that pt has a history of catatonic episodes. Psychiatrist also expresses concern that pt may be having seizures vs. catatonic episode. Of note, has had catatonic episodes in the past, especially when has AH telling him what to do/not do. Also possible that pt is having seizures, of note, Wellbutrin and neuroleptics can lower seizure threshold.     At this point, would recommend medical admission for further workup. Would also recommend neurology consult and consider EEG. Would have psych CL follow up for r/o catatonia and consider Ativan challenge (would coordinate with neurology). Would not recommend psych admission at this time as concern appears to be medically related and would need medical issues ruled out first.    Dx. Catatonia vs. Seizures vs. Other medical issue  Hx Schizophrenia   Utox neg
36yo M, domiciled with mother; employed as a ; self-reported PPHx of depression (per PSYCKES also schizoaffective disorder, bipolar disorder, anxiety), past admissions, denies hx of SIB/SA, hx of aggression; BIB EMS activated by parent; psychiatry consulted for psychosis.    On evaluation today, patient presents as acutely psychotic. He appears internally preoccupied (leading to increased latency of speech onset), endorses AH while remaining very guarded. There may be a component of catatonia as patient continues to blink frequently throughout the interview, however, he has no objective signs of muscle stiffness/rigidity or immobility. Patient would most likely benefit from inpatient psychiatric hospitalization once medically cleared for stabilization and safety of psychotic symptoms.    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  - Upon medical clearance, recommend admission to [facility] for treatment of psychotic symptoms.  - Recommend discontinuation of Wellbutrin due to suspicion for seizures as medication known to lower seizure threshold.  - Recommend restarting antipsychotic medication, Olanzapine 20mg PO QD (as patient was prescribed this in the past as per chart review, 3/10/21).

## 2021-04-30 NOTE — PROGRESS NOTE BEHAVIORAL HEALTH - NSBHFUPINTERVALHXFT_PSY_A_CORE
Patient was seen and evaluated this morning. Chart was reviewed, and patient currently on 1:1. No acute events were reported overnight. As per RN Crystal Davis, patient has been in good behavioral control but continues to present with poor attention and does not respond to her at times.    Upon entering the room, REEG is being set up for patient. The patient is initially looking off with a blank stare but then makes eye contact and responds once interview begins. He reports feeling "fine." He denies any suicidal/homicidal ideations. He is alert and oriented x4. He endorses hearing voices but when asked to elaborate about them/the content, he responds, "I cannot." He initially admits to hearing them "on and off," including since being in the hospital but denies hearing them at present. However, when his speech latency is pointed out to him a little while later, he denies any thought blocking  and responds, "I'm trying to listen to you and them all at once," at which point he admits to still currently hearing voices. He also endorses that they are real as far as he understands.    Patient denies any physical/somatic complaints. On further exam for catatonia, he has full ROM of his extremities and is observed moving his limbs spontaneously as well. He is able to follow commands, his strength is intact, and no stiffness or rigidity is elicited on exam. Frequent blinking is observed throughout the interview, however.
Received signout from overnight telepsychiatry team. Patient was signed out for re-assessment pending collateral and further assessment.     Spoke to outpatient psychiatrist, Dr. Mills. Last saw patient 4/21. On Wellbutrin XL 300mg daily and Invega Sustenna 234mg IM, due for next dose May 7. On evaluation seemed to be doing well overall, some AH, no SI/HI. However did seem to have moments of blank starring, concerning for "absence seizure" and does have a history of catatonia especially if AH are telling him not to drink, not to eat, not to sleep. States that she did a utox on the 21st and it was negative. Psychiatrist does express concerns regarding blank stares and believes that they have gotten worse-- spoke to mother recently. Agrees with admission to hospital.     Spoke to brother, Rommel . States that patient was doing better since discharge from [facility], however on Monday had acute onset of blank stares, inability to engage in any conversation, and slowness. Brother is also concerned because mother describes moments at home to him when he "freezes" both in speech and physically, "he has to be pushed to move to another place." Brother states that patient has a history of catatonia. Denies pt having a history of head injury or seizure.     On reassessment, patient has frequent blinking, thought blocking, increased latency, and at times does not seem to register question asked. States that his is "good" and insists on going home. He denies any AH/VH/SI/HI. Does elaborate that he feels down, wants "a better life," and has trouble seeing a future for himself. I tried to ask him why he thinks his mother and brother are concerned and replied "I don't know." Interview was limited.
Pt was seen, evaluated, chart reviewed.  As per nursing staff, pt has periods of being non-verbal and starring off. On evaluation, pt reports he is "not bad." States he "thinks" his mood is better since he came to the hospital. Pt has some evident thought blocking and appears internally preoccupied at times. Is compliant with medication, denies negative side effects. Endorsed fair sleep and appetite. States he is hearing voices, however unable to explain what they are telling him or give any description of the voices (male vs female, etc). States he was hearing voices prior to the writer coming to his room. States generally they are "on and off." Denied visual hallucinations. Denied paranoia. Denied suicidal/homicidal ideation, intent or plan. Pt denied any COVID related symptoms.    As per pt's mother Constance, she reports she visited pt last night however does not think he is at baseline. Reports he has periods where he is "lucid" but other times he "shuts down." Agrees with admission to psychiatry.

## 2021-04-30 NOTE — PROGRESS NOTE BEHAVIORAL HEALTH - NSBHCONSULTMEDS_PSY_A_CORE FT
Olanzapine 20mg PO daily
Would decrease Wellbutrin XL to 150mg daily   Consider Ativan challenge (coordinate with neuro considering benzo can interfere with EEG reading)
Olanzapine 20mg PO daily

## 2021-04-30 NOTE — PROGRESS NOTE ADULT - ASSESSMENT
36 y/o male with pmhx of anxiety, depression, schizophrenia and bipolar disorder admitted for catatonic episodes.      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

## 2021-04-30 NOTE — PROGRESS NOTE BEHAVIORAL HEALTH - RISK ASSESSMENT
as per initial assessment
Modifiable risk factors include psychosis, psychiatric illness.  Static risk factors include male gender,  race.  Protective factors include social support/family, employment, residential stability, and no acute SI.
Modifiable risk factors include psychosis, psychiatric illness.  Static risk factors include male gender,  race.  Protective factors include social support/family, employment, residential stability, and no acute SI.

## 2021-04-30 NOTE — PROGRESS NOTE ADULT - ASSESSMENT
A 34 y/o male with pmhx of anxiety, depression, schizophrenia and bipolar disorder brought to ED for AMS.  Pt recently discharged from [facility] after being treated there for agitation, and auditory hallucination and since Monday pt has been experiencing zoning out,  for few minutes, pt keeps starring, not responding. Today pt AOx 3, cooperative  compared to yesterday when was not responding at all. Pt at baseline when seen on admission. EEG- negative for seizure.  Suspect catatonic state secondary to psychiatric issues.    Plan:   -No further neurological f/u  -Follow up with psychiatry     THIS IS INCOMPLETE NOTE PLAN TO BE DISCUSSED WITH ATTENDING    A 34 y/o male with pmhx of anxiety, depression, schizophrenia and bipolar disorder brought to ED for AMS.  Pt recently discharged from [facility] after being treated there for agitation, and auditory hallucination and since Monday pt has been experiencing zoning out,  for few minutes, pt keeps starring, not responding. Today pt AOx 3, cooperative  compared to yesterday when was not responding at all. Pt at baseline when seen on admission. EEG- negative for seizure.  Suspect catatonic state secondary to psychiatric issues, pt hearing voices.     Plan:   -No further neurological f/u  -Follow up with psychiatry for medication optimization, pt hearing voices.

## 2021-05-01 ENCOUNTER — INPATIENT (INPATIENT)
Facility: HOSPITAL | Age: 36
LOS: 25 days | Discharge: HOME | End: 2021-05-27
Attending: PSYCHIATRY & NEUROLOGY | Admitting: PSYCHIATRY & NEUROLOGY
Payer: MEDICAID

## 2021-05-01 VITALS
RESPIRATION RATE: 18 BRPM | HEART RATE: 86 BPM | WEIGHT: 164.91 LBS | HEIGHT: 70 IN | TEMPERATURE: 96 F | DIASTOLIC BLOOD PRESSURE: 90 MMHG | SYSTOLIC BLOOD PRESSURE: 151 MMHG

## 2021-05-01 VITALS — TEMPERATURE: 97 F | HEART RATE: 85 BPM | DIASTOLIC BLOOD PRESSURE: 74 MMHG | SYSTOLIC BLOOD PRESSURE: 118 MMHG

## 2021-05-01 PROBLEM — F20.9 SCHIZOPHRENIA, UNSPECIFIED: Chronic | Status: ACTIVE | Noted: 2021-04-28

## 2021-05-01 PROBLEM — F31.9 BIPOLAR DISORDER, UNSPECIFIED: Chronic | Status: ACTIVE | Noted: 2021-04-28

## 2021-05-01 PROBLEM — F41.9 ANXIETY DISORDER, UNSPECIFIED: Chronic | Status: ACTIVE | Noted: 2021-04-28

## 2021-05-01 PROCEDURE — 99232 SBSQ HOSP IP/OBS MODERATE 35: CPT

## 2021-05-01 RX ORDER — BUPROPION HYDROCHLORIDE 150 MG/1
1 TABLET, EXTENDED RELEASE ORAL
Qty: 0 | Refills: 0 | DISCHARGE

## 2021-05-01 RX ORDER — BACITRACIN ZINC 500 UNIT/G
1 OINTMENT IN PACKET (EA) TOPICAL
Qty: 0 | Refills: 0 | DISCHARGE
Start: 2021-05-01

## 2021-05-01 RX ORDER — OLANZAPINE 15 MG/1
1 TABLET, FILM COATED ORAL
Qty: 0 | Refills: 0 | DISCHARGE
Start: 2021-05-01

## 2021-05-01 RX ORDER — PALIPERIDONE 1.5 MG/1
1 TABLET, EXTENDED RELEASE ORAL
Qty: 0 | Refills: 0 | DISCHARGE

## 2021-05-01 RX ORDER — OLANZAPINE 15 MG/1
20 TABLET, FILM COATED ORAL AT BEDTIME
Refills: 0 | Status: DISCONTINUED | OUTPATIENT
Start: 2021-05-01 | End: 2021-05-03

## 2021-05-01 RX ORDER — HALOPERIDOL DECANOATE 100 MG/ML
5 INJECTION INTRAMUSCULAR EVERY 6 HOURS
Refills: 0 | Status: DISCONTINUED | OUTPATIENT
Start: 2021-05-01 | End: 2021-05-27

## 2021-05-01 RX ADMIN — OLANZAPINE 20 MILLIGRAM(S): 15 TABLET, FILM COATED ORAL at 11:39

## 2021-05-01 RX ADMIN — Medication 2 MILLIGRAM(S): at 21:48

## 2021-05-01 RX ADMIN — ENOXAPARIN SODIUM 40 MILLIGRAM(S): 100 INJECTION SUBCUTANEOUS at 11:40

## 2021-05-01 RX ADMIN — OLANZAPINE 20 MILLIGRAM(S): 15 TABLET, FILM COATED ORAL at 20:46

## 2021-05-01 NOTE — PATIENT PROFILE BEHAVIORAL HEALTH - PRO ANTICIPATED DISCH DISP
home
prescription given by MD/Take over-the-counter ibuprofen  as needed for pain. You have also been prescribed percocet for any additional pain, take only as directed.

## 2021-05-01 NOTE — PROGRESS NOTE ADULT - ATTENDING COMMENTS
34 y/o male with pmhx of anxiety, depression, schizophrenia and bipolar disorder admitted for catatonic episodes. EEG showed generalised slowing and seizure r/o. As per Neuro unlikely Neurogenic cause and is medically cleared to psychiatry      #catatonic schizophrenia  - VEEG neg for seizures  - stopped wellbutrin as it lowers the seizure threshold  - started olanzapine 20mg qd  - psych recs appreciated  - neuro recs appreciated  - pt due for invega on 5/7/21  - will be transferred to  psych   Plan d/w mother Constance
I have personally seen and examined this patient.  I have fully participated in the care of this patient.  I have reviewed all pertinent clinical information, including history, physical exam, plan and note.   I have reviewed all pertinent clinical information and reviewed all relevant imaging and diagnostic studies personally.  Obtain records from [facility].  Recommendations as above.  Agree with above assessment except as noted.

## 2021-05-01 NOTE — PATIENT PROFILE BEHAVIORAL HEALTH - NSBHBEHAVIOR_PSY_A_CORE
Problem: Mobility Impaired (Adult and Pediatric)  Goal: *Acute Goals and Plan of Care (Insert Text)  STG:  (1.)Mr. Aristides Alva will move from supine to sit and sit to supine , scoot up and down and roll side to side with MODERATE ASSIST within 3 day(s). (2.)Mr. Aristides Alva will transfer from bed to chair and chair to bed with MAXIMAL ASSIST using the least restrictive device within 3 day(s). (3.)Mr. Aristides Alva will ambulate with MAXIMAL ASSIST for 10 feet with the least restrictive device within 3 day(s). (3.)Mr. Aristides Alva will participate in therapeutic activity/exerices x 12 minutes for increased strength within 3 days. (5.)Mr. Aristides Alva will maintain static/dynamic sitting x 12 minutes with at least CGA for improved balance within 3 days. LTG:  (1.)Mr. Aristides Alva will move from supine to sit and sit to supine , scoot up and down and roll side to side in bed with STAND BY ASSIST within 7 day(s). (2.)Mr. Aristides Alva will transfer from bed to chair and chair to bed with MINIMAL ASSIST using the least restrictive device within 7 day(s). (3.)Mr. Aristides Alva will ambulate with MINIMAL ASSIST for 75 feet with the least restrictive device within 7 day(s). (4.)Mr. Aristides Alva will participate in therapeutic activity/exerices x 12 minutes for increased strength within 7 days. (5.)Mr. Aristides Alva will maintain static/dynamic sitting x 23 minutes with SUPERVISION for improved balance within 7 days.   ________________________________________________________________________________________________      PHYSICAL THERAPY: Daily Note, Treatment Day: 2nd, AM and PM 9/20/2017  INPATIENT: Hospital Day: 7  Payor: [facility] MEDICARE / Plan: [facility] Barn[facility] MEDICARE / Product Type: Managed Care Medicare /      NAME/AGE/GENDER: Julián Leblanc is a 78 y.o. male     PRIMARY DIAGNOSIS: Stroke ([facility] [address] Stroke ([facility] [address] Stroke [facility])        ICD-10: Treatment Diagnosis:       · Generalized Muscle Weakness (M62.81)  · Difficulty in walking, Not elsewhere classified (R26.2)  · Other abnormalities of gait and mobility (R26.89)   Precaution/Allergies:  Review of patient's allergies indicates no known allergies. ASSESSMENT:      Mr. José Miguel Thurman is an 78year old male admitted with acute CVA and seen this AM/PM for physical therapy treatment session. Patient presents in therapy gym with WILLIAM, endorses 0/10 pain and motivated to participate. Performed B LE therapeutic exercises noted below. Required verbal and tactile cues to complete. In the PM, patient participated in therapeutic activity including scooting with minimal assistance, transfers sit to stand with moderate assistance x2, ambulation x5ft with antelmo walker, gait belt, and moderate assistance x2, and sit to supine transfers with maximal assistance. Demonstrated a slow, hemiplegic, step-to gait pattern with decreased step clearance on left required tactile to manual assistance to advance L LE, maintain trunk extension, and advance hemiwalker. Good progress this PM with ability to advance to stepping. Continues to demonstrate decreased functional mobility and balance/gait status from baseline. Recommend continued skilled PT services to address stated deficits. This section established at most recent assessment   PROBLEM LIST (Impairments causing functional limitations):  1. Decreased Strength  2. Decreased ADL/Functional Activities  3. Decreased Transfer Abilities  4. Decreased Ambulation Ability/Technique  5. Decreased Balance  6. Decreased Cognition    INTERVENTIONS PLANNED: (Benefits and precautions of physical therapy have been discussed with the patient.)  1. Balance Exercise  2. Bed Mobility  3. Family Education  4. Gait Training  5. Neuromuscular Re-education/Strengthening  6. Therapeutic Activites  7. Therapeutic Exercise/Strengthening  8. Transfer Training  9.  Group Therapy      TREATMENT PLAN: Frequency/Duration: 3 times a week for duration of hospital stay  Rehabilitation Potential For Stated Goals: GOOD RECOMMENDED REHABILITATION/EQUIPMENT: (at time of discharge pending progress): Due to the probability of continued deficits (see above) this patient will likely need continued skilled physical therapy after discharge. Equipment:   · None at this time                   HISTORY:   History of Present Injury/Illness (Reason for Referral):  Per H&P:\"  HPI:      Pt is a 77 yo male who presents to ER this afternoon with left facial droop, slurred speech, LUE weakness/numbness noted on awakening a few hours ago. Pt was last seen \"normal\" at 0230 this morning. PMH includes CAD, CABG, paroxysmal A Fib, HTN, HLD. CT head reveals right MCA infarct. Pt given  mg. He does not participate full in NIHSS scale due to drowsiness but neuro exam reveals A&O x 3 although some word salad but eventually answers orientation questions correctly, slurred speech, left facial droop, LUE with flexion to pain but no purposeful movement, dulled sensation LUE, left side neglect, eyes do not cross midline to left. He has already been evaluated by speech who recommends continued NPO for now due to delayed swallowing. He is in A Fib on monitor. BP slightly elevated, has not had any of his typical am meds. Follows with [employer]. Wife at bedside and plan of care discussed thoroughly. \"     Past Medical History/Comorbidities:   Mr. Narciso Landaverde  has a past medical history of Arrhythmia; Arthritis; CAD (coronary artery disease) (12/1999); Carotid artery stenosis without cerebral infarction (1/18/2016); Chronic pain; Claudication ([address] Coronary atherosclerosis of native coronary artery (1/18/2016); GERD (gastroesophageal reflux disease); Hyperlipidemia; Hypertension; Kidney stones; MI (myocardial infarction) ([address] (12/1999); PAF (paroxysmal atrial fibrillation) ([address] and Thromboembolus ([address]   Mr. Narciso Landaverde  has a past surgical history that includes cardiac surg procedure unlist (12/1999); abdomen surgery proc unlisted (2000?); appendectomy (age 13); heent (1's?); and ptca. Social History/Living Environment:   Home Environment: Private residence  # Steps to Enter: 1  One/Two Story Residence: One story  Living Alone: No  Support Systems: Spouse/Significant Other/Partner  Patient Expects to be Discharged to[de-identified] Unknown  Current DME Used/Available at Home: None  Tub or Shower Type: Tub/Shower combination  Prior Level of Function/Work/Activity:  Lives with his wife in a one story house with a step to enter and was independent with ADLs/ambulation PTA      Number of Personal Factors/Comorbidities that affect the Plan of Care:  Chronic pain 1-2: MODERATE COMPLEXITY   EXAMINATION:   Most Recent Physical Functioning:   Gross Assessment:                  Posture:     Balance:  Sitting: Impaired  Sitting - Static: Fair (occasional)  Sitting - Dynamic: Fair (occasional)  Standing: Impaired  Standing - Static: Poor  Standing - Dynamic : Poor Bed Mobility:  Sit to Supine: Maximum assistance;Assist x2  Wheelchair Mobility:     Transfers:  Sit to Stand: Moderate assistance  Stand to Sit: Maximum assistance  Bed to Chair: Moderate assistance;Assist x2  Gait:     Base of Support: Shift to right;Center of gravity altered  Speed/Claire: Slow;Shuffled  Swing Pattern: Right asymmetrical  Stance: Right increased; Left decreased  Gait Abnormalities: Decreased step clearance; Hemiplegic; Shuffling gait; Step to gait;Trunk sway increased  Distance (ft): 5 Feet (ft)  Assistive Device: Walker antelmo  Ambulation - Level of Assistance: Moderate assistance;Assist x2  Interventions: Safety awareness training; Tactile cues; Verbal cues       Body Structures Involved:  1. Nerves  2. Voice/Speech  3. Muscles Body Functions Affected:  1. Mental  2. Sensory/Pain  3. Voice and Speech  4. Neuromusculoskeletal  5. Movement Related Activities and Participation Affected:  1. General Tasks and Demands  2. Mobility  3. Self Care  4. Domestic Life  5.  Community, Social and [place] Number of elements that affect the Plan of Care: 4+: HIGH COMPLEXITY   CLINICAL PRESENTATION:   Presentation: Evolving clinical presentation with changing clinical characteristics: MODERATE COMPLEXITY   CLINICAL DECISION MAKIN [facility] Mobility Inpatient Short Form  How much difficulty does the patient currently have. .. Unable A Lot A Little None   1. Turning over in bed (including adjusting bedclothes, sheets and blankets)? [ ] 1   [X] 2   [ ] 3   [ ] 4   2. Sitting down on and standing up from a chair with arms ( e.g., wheelchair, bedside commode, etc.)   [ ] 1   [X] 2   [ ] 3   [ ] 4   3. Moving from lying on back to sitting on the side of the bed? [ ] 1   [X] 2   [ ] 3   [ ] 4   How much help from another person does the patient currently need. .. Total A Lot A Little None   4. Moving to and from a bed to a chair (including a wheelchair)? [ ] 1   [X] 2   [ ] 3   [ ] 4   5. Need to walk in hospital room? [ ] 1   [X] 2   [ ] 3   [ ] 4   6. Climbing 3-5 steps with a railing? [ ] 1   [X] 2   [ ] 3   [ ] 4   © , Trustees of [address] [place] [address], under license to [employer]. All rights reserved    Score:  Initial: 12 Most Recent: X (Date: -- )     Interpretation of Tool:  Represents activities that are increasingly more difficult (i.e. Bed mobility, Transfers, Gait).        Score 24 23 22-20 19-15 14-10 9-7 6       Modifier CH CI CJ CK CL CM CN         · Mobility - Walking and Moving Around:               - CURRENT STATUS:    CL - 60%-79% impaired, limited or restricted               - GOAL STATUS:           CK - 40%-59% impaired, limited or restricted               - D/C STATUS:                       ---------------To be determined---------------  Payor: [facility] [place] / Plan: [place] [facility] [place] / Product Type: Managed Care Medicare /       Medical Necessity:     · Patient demonstrates good rehab potential due to higher previous functional level. Reason for Services/Other Comments:  · Patient continues to require skilled intervention due to decreased functional mobility, balance, and ambulation. Use of outcome tool(s) and clinical judgement create a POC that gives a: Questionable prediction of patient's progress: MODERATE COMPLEXITY                 TREATMENT:   (In addition to Assessment/Re-Assessment sessions the following treatments were rendered)   Pre-treatment Symptoms/Complaints:  See above  Pain: Initial:   Pain Intensity 1: 4  Pain Location 1: Head, Neck  Pain Orientation 1: Right  Pain Intervention(s) 1: Ambulation/Increased Activity, Emotional support, Rest, Repositioned, Family Support  Post Session:  Neck pain      Therapeutic Activity: (    10 minutes): Therapeutic activities including bed mobility, transfer training, balance training, safety awareness training, ambulation on level ground x5ft, and patient education to improve mobility, strength and coordination. Required maximal Safety awareness training; Tactile cues; Verbal cues to promote static and dynamic balance in standing and promote coordination of bilateral, upper extremity(s), lower extremity(s). GROUP Therapeutic Exercise: (10 Minutes):  Exercises per grid below to improve mobility, strength, balance and coordination. Required minimal visual, verbal and tactile cues to promote proper body alignment, promote proper body posture and promote proper body mechanics. Progressed resistance, range and repetitions as indicated.    Date:  9/20/17 Date:   Date:     Activity/Exercise Parameters Parameters Parameters   Seated hip flexion x15B     Seated hip ab/adduction x15B     Seated LAQ x15B     Seated ankle plantar flexion x20B     Seated ankle dorsiflexion x20B          Braces/Orthotics/Lines/Etc:   · O2 Device: Room air  Treatment/Session Assessment:    · Response to Treatment:  See above  · Interdisciplinary Collaboration:  · Physical Therapist, Certified Occupational Therapy Assistant, Registered Nurse, Rehabilitation Attendant and Certified Nursing Assistant/Patient Care Technician  · After treatment position/precautions:  · Supine in bed, Bed/Chair-wheels locked, Bed in low position, Call light within reach, RN notified and WILLIAM at bedside  · Compliance with Program/Exercises: Will assess as treatment progresses. · Recommendations/Intent for next treatment session: \"Next visit will focus on advancements to more challenging activities and reduction in assistance provided\".     Total Treatment Duration:PT Patient Time In/Time Out  Time In: 1140 (1450)  Time Out: 1150 (1500)     Kindra Larose DPT quiet

## 2021-05-01 NOTE — H&P ADULT - ASSESSMENT
36 y/o male with pmhx of anxiety, depression, schizophrenia and bipolar disorder admitted to IPP with catatonia.    # catatonia  # anxiety  # depression  # schizophrenia  # bipolar disorder  - management per psychiatry  - neuro consult appreciated  - EEG reviewed- generalized slowing  - EKG WNL  - labs WNL  - recall prn

## 2021-05-01 NOTE — H&P ADULT - NSHPLABSRESULTS_GEN_ALL_CORE
13.0   5.09  )-----------( 285      ( 30 Apr 2021 06:15 )             38.6       04-30    143  |  102  |  17  ----------------------------<  106<H>  4.0   |  27  |  0.9    Ca    9.7      30 Apr 2021 06:15

## 2021-05-01 NOTE — BEHAVIORAL HEALTH ASSESSMENT NOTE - SUMMARY
The patient is a 35-year-old male; domiciled with mother; employed as a ; self-reported PPHx of depression (per PSYCKES also schizoaffective disorder, bipolar disorder, anxiety), past admissions, denies hx of SIB/SA, hx of aggression; BIB EMS activated by parent; psychiatry consulted for psychosis.  Pt with speech latency and possible thought blocking though denies current SI/HI/AVH.  Pt's mother is concerned about pt and does not feel he is back to baseline but does not relay any specific safety concerns.   Patient without evidence of organic causes of current presentation. it is possible that his catatonia is secondary to his schizophrenia that is being treated. patient maintains limited engagement warranting ongoing monitoring and treatment optimization.    Psychosis   - continue olanzapine 20mg po qhs  - consider initiation of lorazepam in the daytime

## 2021-05-01 NOTE — BEHAVIORAL HEALTH ASSESSMENT NOTE - NSBHCHARTREVIEWVS_PSY_A_CORE FT
Vital Signs Last 24 Hrs  T(C): 35.6 (01 May 2021 13:15), Max: 36.6 (01 May 2021 05:56)  T(F): 96.1 (01 May 2021 13:15), Max: 97.8 (01 May 2021 05:56)  HR: 86 (01 May 2021 13:15) (80 - 86)  BP: 151/90 (01 May 2021 13:15) (111/60 - 151/90)  BP(mean): --  RR: 18 (01 May 2021 13:15) (18 - 19)  SpO2: --

## 2021-05-01 NOTE — DISCHARGE NOTE PROVIDER - HOSPITAL COURSE
36 y/o male with pmhx of anxiety, depression, schizophrenia and bipolar disorder brought to ED for AMS.  Pt recently discharged from [facility] form [facility] after being treated there for agitation, and auditory hallucination and since Monday pt has been experiencing zoning out,  for few minutes, pt keeps starring, not responding or gets agitated. Pt reports this episodes comes all of the sudden. Pt denies nausea, vomiting, floaters.  As per EMR brother Rommel has reports this sporadic events have happened in the past but were less severe. As per pt  outpatient psychiatrist, Dr. Mills. pt on Wellbutrin XL 300mg daily and Invega Sustenna 234mg IM, next due for next dose May 7. Pt reports used to take Gabapentin 300 mg TID for many years and stopped it on his own few months ago. Pt denies hx of seizure.   Psychiatry consulted and initiated treatment with Zyprexa 20mg daily.  Neurology consulted, they suspect catatonic state secondary to psychiatric condition. Advised no further neurological work-up or follow up needed. Advised IPP management.  VS stable, medically stable. Pt discharged to IPP .

## 2021-05-01 NOTE — BEHAVIORAL HEALTH ASSESSMENT NOTE - RISK ASSESSMENT
Low Acute Suicide Risk risk factors: male, single, psych dx, past admissions  protective factors: domiciled, engaged in outpatient tx, denies SI, no hx SIB/SA, denies access to guns/weapons

## 2021-05-01 NOTE — DISCHARGE NOTE PROVIDER - NSDCMRMEDTOKEN_GEN_ALL_CORE_FT
bacitracin 500 units/g topical ointment: 1 application topically 2 times a day  OLANZapine 20 mg oral tablet, disintegratin tab(s) orally once a day

## 2021-05-01 NOTE — H&P ADULT - HISTORY OF PRESENT ILLNESS
36 y/o male with pmhx of anxiety, depression, schizophrenia and bipolar disorder brought to ED for AMS.  Pt recently discharged from [facility] form [facility] after being treated there for agitation, and auditory hallucination and since Monday pt has been experiencing zoning out,  for few minutes, pt keeps staring, not responding or gets agitated. Pt reports this episodes comes all of the sudden. Pt denies nausea, vomiting, floaters.  As per EMR brother Rommel has reports this sporadic events have happened in the past but were less severe. As per pt  outpatient psychiatrist, Dr. Mills. pt on Wellbutrin XL 300mg daily and Invega Sustenna 234mg IM, next due for next dose May 7. Pt reports used to take Gabapentin 300 mg TID for many years and stopped it on his own few months ago. Pt denies hx of seizure. Patient admitted to medicine to r/o seizure. Psychiatry consulted and initiated treatment with Zyprexa 20mg daily. Neurology consulted, they suspect catatonic state secondary to psychiatric condition. Advised no further neurological work-up or follow up needed. Advised IPP management.  In IPP, patient is awake. No participating with interview or examination.
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
## 2021-05-01 NOTE — H&P ADULT - NSHPPHYSICALEXAM_GEN_ALL_CORE
Vital Signs (24 Hrs):  T(C): 35.6 (05-01-21 @ 13:15), Max: 36.7 (04-30-21 @ 21:00)  HR: 86 (05-01-21 @ 13:15) (80 - 97)  BP: 151/90 (05-01-21 @ 13:15) (111/60 - 151/90)  RR: 18 (05-01-21 @ 13:15) (18 - 19)  SpO2: --  Wt(kg): --  Daily Height in cm: 177.8 (01 May 2021 13:15)    Daily     refer to ED physical examination

## 2021-05-01 NOTE — BEHAVIORAL HEALTH ASSESSMENT NOTE - NSBHMEDSOTHERFT_PSY_A_CORE
Home Medications:  bacitracin 500 units/g topical ointment: 1 application topically 2 times a day (01 May 2021 10:53)  OLANZapine 20 mg oral tablet, disintegratin tab(s) orally once a day (01 May 2021 10:53)

## 2021-05-01 NOTE — PROGRESS NOTE ADULT - SUBJECTIVE AND OBJECTIVE BOX
Medicine Progress Note    Patient is a 35y old  Male who presents with a chief complaint of catatonia r/o seizure (01 May 2021 10:48)      SUBJECTIVE / OVERNIGHT EVENTS:    ADDITIONAL REVIEW OF SYSTEMS:    MEDICATIONS  (STANDING):  BACItracin   Ointment 1 Application(s) Topical two times a day  chlorhexidine 4% Liquid 1 Application(s) Topical <User Schedule>  enoxaparin Injectable 40 milliGRAM(s) SubCutaneous daily  OLANZapine Disintegrating Tablet 20 milliGRAM(s) Oral daily    MEDICATIONS  (PRN):    CAPILLARY BLOOD GLUCOSE        I&O's Summary      PHYSICAL EXAM:  Vital Signs Last 24 Hrs  T(C): 35.6 (01 May 2021 13:15), Max: 36.7 (30 Apr 2021 21:00)  T(F): 96.1 (01 May 2021 13:15), Max: 98.1 (30 Apr 2021 21:00)  HR: 86 (01 May 2021 13:15) (80 - 97)  BP: 151/90 (01 May 2021 13:15) (111/60 - 151/90)  BP(mean): --  RR: 18 (01 May 2021 13:15) (18 - 19)  SpO2: --  CONSTITUTIONAL: NAD, well-developed, well-groomed  ENMT: Moist oral mucosa, no pharyngeal injection or exudates; normal dentition  RESPIRATORY: Normal respiratory effort; lungs are clear to auscultation bilaterally  CARDIOVASCULAR: Regular rate and rhythm, normal S1 and S2, no murmur/rub/gallop; No lower extremity edema; Peripheral pulses are 2+ bilaterally  ABDOMEN: Nontender to palpation, normoactive bowel sounds, no rebound/guarding; No hepatosplenomegaly  PSYCH: A+O to person, place, and time; affect appropriate  NEUROLOGY: CN 2-12 are intact and symmetric; no gross sensory deficits   SKIN: No rashes; no palpable lesions    LABS:                        13.0   5.09  )-----------( 285      ( 30 Apr 2021 06:15 )             38.6     04-30    143  |  102  |  17  ----------------------------<  106<H>  4.0   |  27  |  0.9    Ca    9.7      30 Apr 2021 06:15                SARS-CoV-2: NotDetec (30 Apr 2021 15:45)  SARS-CoV-2: NotDetec (27 Apr 2021 14:14)      RADIOLOGY & ADDITIONAL TESTS:  Imaging from Last 24 Hours:    Electrocardiogram/QTc Interval:    COORDINATION OF CARE:  Care Discussed with Consultants/Other Providers:

## 2021-05-01 NOTE — CHART NOTE - NSCHARTNOTEFT_GEN_A_CORE
1hr after receiving Zyprexa, nursing reported that patient was sitting and continuously looking at the wall without engaging. on approach, patient only responded after his name was yelled loudly and then he slowly turned to look at writer. He reports having auditory hallucinations, but would not communicate what he's hearing. Denies any thoughts of ending life or harming others. States that his delay in responding was due to a lot of things going on in his mind. Patient offered lorazepam. Patient initially declined and asked if he could think about it. Patient also informed that family had been trying to get in contact with him and was resistant to taking a walk to see where the phone was located. Patient approached a few minutes later and accepted lorazepam and although hesitant went with me and nurse [place] to see the telephone and then walked back to room.   Patient denied any sudden adverse side-effects from Ativan.     Plan updated to include: ativan 2mg po q4hrs PRN for catatonia

## 2021-05-01 NOTE — BEHAVIORAL HEALTH ASSESSMENT NOTE - HPI (INCLUDE ILLNESS QUALITY, SEVERITY, DURATION, TIMING, CONTEXT, MODIFYING FACTORS, ASSOCIATED SIGNS AND SYMPTOMS)
HPI from ED behavioral assessment prior to admission to medicine. Objective data and assessment updated.     The patient is a 35-year-old male; domiciled with mother; employed as a ; self-reported PPHx of depression (per [facility]YCKES also schizoaffective disorder, bipolar disorder, anxiety), past admissions, denies hx of SIB/SA, hx of aggression; BIB EMS activated by parent; psychiatry consulted for psychosis.  Pt states that he was hearing stuff earlier but he "can't tell me too much about them."  He states the voices are male and female but he doesn't know where they are coming from.  He states that they say "good and bad things" but can't elaborate or provide examples.  At times pt with significant speech latency and when asked about it, he states that he is "blacking out, in a deep stare."  Pt reports his mood is "good, could be better," sleep is always poor (5 hours), reports having normal energy, denies anhedonia, reports eating all day (estimates 10 lb weight gain in past few weeks), denies SI/HI.  When asked for collateral contact numbers (psychiatrist and mother), he asks if he has to tell me because he worries for their protection but can't elaborate why.  He then states he will tell me their numbers and then says, "on second thought, I can't."  Pt states he takes 1 pill daily and has been compliant.    Collateral obtained from pt's mother.  she states that pt was hospitalized for about 3 weeks.  He was hearing voices at that time telling him not to eat and he had subsequently lost 20-30 lbs.  Since he was discharged, she has been trying to supplement his diet with electrolyte/nutritional drinks.  She believes he has gained some weight back but pt will not weigh himself.  They saw pt's psychiatrist on Wednesday and he mentioned how he "zones out" and pt's psychiatrist was not overly concerned about this and told collateral that his brain is still healing though she would monitor it with 1-2 more tests (collateral uncertain which tests).  Pt was also started on a risperdal injection but is not due again until May.  She states that pt returned to work for the first time yesterday (after ~6 weeks off).  He works with his brother and the day seemed to go OK but after returning home, he had an acute change.  He "wasn't talking, wasn't listening, wasn't alert, was pacing back and forth, didn't sleep, seemed disoriented, in a trance, zoned out."  She notes that pt was diagnosed with schizophrenia ~10 years ago and had head imaging done a long time ago.  She denies that he has any hx of SI/SA or violence.  Pt has no PMHx, keeps up with ADLs meticulously, no access to guns/weapons.  She states that pt used substances years ago (?percocet for pain) and she believes that is how this all started.  She identifies recent losses (pt's father  last year, then grandmother , then uncle had an accident and ).  There is no known FHx of mental illness.  Pt in outpatient tx at [facility] with "Dr. Mills" and therapist "Son."    Writer attempted to reach outpatient providers but clinic current closed ([phone]).    Interval    Copied from : Spoke to outpatient psychiatrist, Dr. Mills. Last saw patient . On Wellbutrin XL 300mg daily and Invega Sustenna 234mg IM, due for next dose May 7. On evaluation seemed to be doing well overall, some AH, no SI/HI. However did seem to have moments of blank starring, concerning for "absence seizure" and does have a history of catatonia especially if AH are telling him not to drink, not to eat, not to sleep. States that she did a utox on the  and it was negative. Psychiatrist does express concerns regarding blank stares and believes that they have gotten worse-- spoke to mother recently. Agrees with admission to hospital.     Copied from : Spoke to brother, Rommel . States that patient was doing better since discharge from [facility], however on Monday had acute onset of blank stares, inability to engage in any conversation, and slowness. Brother is also concerned because mother describes moments at home to him when he "freezes" both in speech and physically, "he has to be pushed to move to another place." Brother states that patient has a history of catatonia. Denies pt having a history of head injury or seizure.    From today : Patient seen this morning and was observed gazing out the window. Patient initially denied any concerning thoughts, but later stated that he does hear voices and there are a lot of thoughts going through his mind. Patient reports that he is hearing a familiar voice, but cannot decipher who it is. He states that sometimes it's incomprehensible and sometimes it is giving orders, but not currently. Patient with difficulty identifying specific thoughts. Denies wanting to die or kill himself. Patient was admitted to medicine for medical workup after his psychiatrist expressed concern for absence seizure. No evidence of seizures and patient was discharged to [facility] for treatment of psychosis.

## 2021-05-01 NOTE — BEHAVIORAL HEALTH ASSESSMENT NOTE - PERCEPTIONS
Chitra Acosta MD    Provider located at [address] [address]      Recent Visits  Date Type Provider Dept   01/02/21 181 [facility], [address] recent visits within past 7 days and meeting all other requirements     Today's Visits  Date Type Provider Dept   01/05/21 Telephone Jaimie Sarmiento [address] Ariana Mari is a 35 y o  female   HPI   Janiya Arriaga [facility] [address] )     Von Willebrand disease ([facility] [address] )        face-to-face evaluation by the physician  Arabella Black understands and accepts these terms  No abnormalities

## 2021-05-02 LAB
COVID-19 SPIKE DOMAIN AB INTERP: POSITIVE
COVID-19 SPIKE DOMAIN ANTIBODY RESULT: 44.9 U/ML — HIGH
SARS-COV-2 IGG+IGM SERPL QL IA: 44.9 U/ML — HIGH
SARS-COV-2 IGG+IGM SERPL QL IA: POSITIVE

## 2021-05-02 PROCEDURE — 99232 SBSQ HOSP IP/OBS MODERATE 35: CPT

## 2021-05-02 RX ADMIN — Medication 2 MILLIGRAM(S): at 13:46

## 2021-05-02 RX ADMIN — HALOPERIDOL DECANOATE 5 MILLIGRAM(S): 100 INJECTION INTRAMUSCULAR at 13:46

## 2021-05-02 RX ADMIN — Medication 2 MILLIGRAM(S): at 20:27

## 2021-05-02 RX ADMIN — Medication 2 MILLIGRAM(S): at 03:49

## 2021-05-02 RX ADMIN — OLANZAPINE 20 MILLIGRAM(S): 15 TABLET, FILM COATED ORAL at 20:28

## 2021-05-02 NOTE — PROGRESS NOTE BEHAVIORAL HEALTH - NSBHFUPINTERVALHXFT_PSY_A_CORE
Pt seen and examined. Chart reviewed. He reports sleeping well last night. He reports good appetite. He denies SI/HI. Observed to have significant thought blocking but denies AVH. He denies side effects to current medication regimen. No acute events reported overnight.

## 2021-05-02 NOTE — CONSULT NOTE ADULT - SUBJECTIVE AND OBJECTIVE BOX
PHYLLIS XIAO  35y  Male      Patient is a 35y old  Male who presents with a chief complaint of     INTERVAL HPI/OVERNIGHT EVENTS:    pt seen and examined at bedside   -in catatonic state  -care as per Psych team     REVIEW OF SYSTEMS:  -unable to determine     -Vital Signs Last 24 Hrs  T(C): 35.7 (02 May 2021 08:19), Max: 35.7 (02 May 2021 08:19)  T(F): 96.2 (02 May 2021 08:19), Max: 96.2 (02 May 2021 08:19)  HR: 88 (02 May 2021 08:19) (60 - 88)  BP: 129/71 (02 May 2021 08:19) (129/71 - 132/57)  RR: 18 (02 May 2021 08:19) (18 - 18)      PHYSICAL EXAM:  GENERAL: NAD, staring at the wall (piece of paper with TV drawn); pt is watching TV in his mind   HEAD:  Atraumatic, Normocephalic  EYES: EOMI, PERRLA, conjunctiva and sclera clear  NERVOUS SYSTEM: awake not alert; in catatonic state   CHEST/LUNG: Clear to percussion bilaterally; No rales, rhonchi, wheezing, or rubs  CV/HEART: Regular rate and rhythm; No murmurs, rubs, or gallops  GI/ABDOMEN: Soft, Nontender, Nondistended; Bowel sounds present  EXTREMITIES:  2+ Peripheral Pulses, No clubbing, cyanosis, or edema  SKIN: No rashes or lesions    LAB:    Daily     Daily   CAPILLARY BLOOD GLUCOSE    RADIOLOGY:    Imaging Personally Reviewed:  [Y] YES  [ ] NO    HEALTH ISSUES - PROBLEM Dx:    MEDS:  haloperidol     Tablet 5 milliGRAM(s) Oral every 6 hours PRN  LORazepam     Tablet 2 milliGRAM(s) Oral every 4 hours PRN  OLANZapine Disintegrating Tablet 20 milliGRAM(s) Oral at bedtime

## 2021-05-02 NOTE — PROGRESS NOTE BEHAVIORAL HEALTH - SUMMARY
The patient is a 35-year-old male; domiciled with mother; employed as a ; self-reported PPHx of depression (per PSY[facility]ES also schizoaffective disorder, bipolar disorder, anxiety), past admissions, denies hx of SIB/SA, hx of aggression; BIB EMS activated by parent; psychiatry consulted for psychosis.  Pt with speech latency and possible thought blocking though denies current SI/HI/AVH.  Pt's mother is concerned about pt and does not feel he is back to baseline but does not relay any specific safety concerns.   Patient without evidence of organic causes of current presentation. it is possible that his catatonia is secondary to his schizophrenia that is being treated. patient maintains limited engagement warranting ongoing monitoring and treatment optimization.    Psychosis   - continue olanzapine 20mg po qhs    Catatonia  -continue ativan 2mg PO Q4h PRN

## 2021-05-02 NOTE — CONSULT NOTE ADULT - ASSESSMENT
patient with psych disorder requiring inpatient psychiatric stabilization     1) Schizophrenia/BIpolar Disorder  -meds and care as per Psych team     Please call for any medical questions     Attending Physician Dr. Ayana Hamm # [id number]

## 2021-05-03 DIAGNOSIS — F20.9 SCHIZOPHRENIA, UNSPECIFIED: ICD-10-CM

## 2021-05-03 PROCEDURE — 99232 SBSQ HOSP IP/OBS MODERATE 35: CPT

## 2021-05-03 RX ORDER — OLANZAPINE 15 MG/1
30 TABLET, FILM COATED ORAL AT BEDTIME
Refills: 0 | Status: DISCONTINUED | OUTPATIENT
Start: 2021-05-03 | End: 2021-05-27

## 2021-05-03 RX ORDER — HYDROXYZINE HCL 10 MG
50 TABLET ORAL EVERY 6 HOURS
Refills: 0 | Status: DISCONTINUED | OUTPATIENT
Start: 2021-05-03 | End: 2021-05-27

## 2021-05-03 RX ADMIN — OLANZAPINE 30 MILLIGRAM(S): 15 TABLET, FILM COATED ORAL at 20:04

## 2021-05-03 RX ADMIN — Medication 2 MILLIGRAM(S): at 20:04

## 2021-05-03 NOTE — PROGRESS NOTE BEHAVIORAL HEALTH - SUMMARY
The patient is a 34yo M, domiciled with mother; employed as a ; self-reported PPHx of depression (per PSY[facility]ES also schizoaffective disorder, bipolar disorder, anxiety), past admissions, denies hx of SIB/SA, hx of aggression; BIB EMS activated by parent; psychiatry consulted for psychosis. Patient was initially admitted to medical floor, where he was evaluated for seizures and workup came back negative. Patient was then admitted to [facility] for acute psychosis with catatonic features.    Patient presented with minimal response/engagement this morning but noticeable psychomotor retardation. His catatonic features are most likely attributable to his decompensated psychotic state 2/2 to primary diagnosis of Schizophrenia. He would benefit from further inpatient hospitalization for safety and stabilization.    Psychosis with catatonic features:  - Increase Zyprexa Zydis to 30mg OP QHS.  - Ativan 2mg PO TID for catatonia.    Agitation:  - Haldol 5mg PO PRN Q6 and Ativan 1mg PO PRN Q6 for agitation not responding to verbal redirection with escalation to IM should the patient refuse/become an acute danger to himself/others. EKG to follow.    Anxiety/Insomnia:  - Atarax 50mg PO QHS for anxiety/insomnia.

## 2021-05-03 NOTE — PROGRESS NOTE BEHAVIORAL HEALTH - NSBHCHARTREVIEWINVESTIGATE_PSY_A_CORE FT
< from: 12 Lead ECG (04.27.21 @ 14:51) >  Ventricular Rate 84 BPM  Atrial Rate 84 BPM  P-R Interval 132 ms  QRS Duration 86 ms  Q-T Interval 368 ms  QTC Calculation(Bazett) 434 ms  P Axis 58 degrees  R Axis 70 degrees  T Axis 52 degrees    Diagnosis Line Normal sinus rhythm  Normal ECG    Confirmed by KARRI LEE, JANINE ([id number]) on 4/28/2021 9:06:23 AM    < end of copied text >      < from: EEG (04.29.21 @ 12:00) >  EVENTS  Abnormal awake routine EEG due to the presence of moderate generalized slowing  IMPRESSION:  Abnormal routine EEG due to the presence of moderate generalized slowing and low amplitude  CLINICAL CORRELATION  Consistent with moderate diffuse cerebral electrophysiological dysfunction secondary to none specificcause clinical correlation is recommended    NONI ZELAYA MD  This document has been electronically signed. Apr 30 2021  7:19AM    < end of copied text >

## 2021-05-03 NOTE — PROGRESS NOTE BEHAVIORAL HEALTH - OTHER
unable to assess did not verbalize response defer not responding psychomotor retardation eyes closed, appears asleep

## 2021-05-03 NOTE — CHART NOTE - NSCHARTNOTEFT_GEN_A_CORE
Social Work Admit Note:    Patient is 35 years of age male who was admitted for evaluation of psychosis.  Prior to admission patient was hearing voices that were male and female.  He reported thoughts going through his mind that sometimes give orders and at times are not comprehensible.  Suicidal ideation denied.      In the community patient resides in a private residence with his mother.  Patient is employed as a .  Marital status is single.  Treatment history is for depression, schizoaffective disorder, bipolar disorder and anxiety.  Patient has an outpatient mental health provider.  He is seen by a psychiatrist Dr. Melissa Cummins and therapist Clifton Jensen [phone].  This worker spoke with patient’s therapist this morning.      Sexual History – patient identifies as heterosexual. 	    Family relationships and history – Patient’s primary social support is his mother.       Leisure Activity Assessment – not disclosed     Community Supports – No known attendance in any self- help groups or other organizations.     Employment – patient is employed as a      Substance Use Assessment – no known use of alcohol or other substances        History of suicidality or self- injurious behaviors – no known history     Significant Loses – None identified.      Life Goals – not disclosed        will continue to meet with patient 1:1 and with treatment team daily.  Discharge plan is for continued mental health treatment in outpatient setting.      Please refer to Social Work Psychosocial for additional information.

## 2021-05-03 NOTE — PROGRESS NOTE BEHAVIORAL HEALTH - NSBHFUPINTERVALHXFT_PSY_A_CORE
Patient was seen this morning. Chart was reviewed and no acute events were reported by staff overnight. Patient has been receiving PRN Ativan 2mg fairly consistently for catatonia with last dose being at 20:00 yesterday. He also received a dose of 5mg PRN Haldol for agitation at 13:00 yesterday.    On approach this morning, patient was asleep in bed. Patient was difficult to wake up. With verbal stimulus, patient very slowly turned in our direction, briefly opened his eyes, but then closed them once again.    Attempt was made to call patient's mother, Constance, but went straight to voicemail.    Patient's psychiatrist provided additional collateral. Patient had recent IPP admission x2 at [facility] for psychotic episode. He had been on Gabapentin, Zyprexa, and Paxil in the past, but most recently was taking Wellbutrin and received 2 injections of Invega Sustenna. While he appeared 80% better at discharge as per psychiatrist, he deteriorated again upon returning to work with his brother. He has a history of catatonic features with his decompensations, and at this point, absence seizures have been ruled out (during this past admission to medical floor). Patient was seen this morning. Chart was reviewed and no acute events were reported by staff overnight. Patient has been receiving PRN Ativan 2mg fairly consistently for catatonia with last dose being at 20:00 yesterday. He also received a dose of 5mg PRN Haldol for agitation at 13:00 yesterday.    On approach this morning, patient was asleep in bed. Patient was difficult to wake up. With verbal stimulus, patient very slowly turned in our direction, briefly opened his eyes, but then closed them once again.    Attempt was made to call patient's mother, Constance, but went straight to [facility].    Patient's outpatient psychiatrist, Dr. Mancilla ([phone]) provided additional collateral. Patient had recent IPP admission x2 at [facility] for psychotic episode. He had been stable on Gabapentin, Zyprexa, and Paxil in the past, and then weaned off of Paxil. Was stable on Zyprexa and Gabapentin for about 2 years. Most recently had 2 IPP admissions in [facility]. First was discharged and given wrong medications at discharge. then re-admitted, and had a long hospital stay of about 1 month. Reports was doing better when put on Wellbutrin and received 2 injections of Invega Sustenna. While he appeared 80% better at discharge as per psychiatrist, he deteriorated again upon returning to work with his brother. She believes he gets psychotic in context of drug use and "takes a while to return to baseline." He has a history of catatonic features with his decompensations, and at this point, absence seizures have been ruled out (during this past admission to medical floor). Pt has been on Klonopin during the 1st admission in March.    Of note, EEG showed generalized slowing

## 2021-05-04 DIAGNOSIS — F06.1 CATATONIC DISORDER DUE TO KNOWN PHYSIOLOGICAL CONDITION: ICD-10-CM

## 2021-05-04 LAB
A1C WITH ESTIMATED AVERAGE GLUCOSE RESULT: 5.4 % — SIGNIFICANT CHANGE UP (ref 4–5.6)
CHOLEST SERPL-MCNC: 128 MG/DL — SIGNIFICANT CHANGE UP
ESTIMATED AVERAGE GLUCOSE: 108 MG/DL — SIGNIFICANT CHANGE UP (ref 68–114)
HDLC SERPL-MCNC: 41 MG/DL — SIGNIFICANT CHANGE UP
LIPID PNL WITH DIRECT LDL SERPL: 73 MG/DL — SIGNIFICANT CHANGE UP
NON HDL CHOLESTEROL: 87 MG/DL — SIGNIFICANT CHANGE UP
SARS-COV-2 RNA SPEC QL NAA+PROBE: SIGNIFICANT CHANGE UP
TRIGL SERPL-MCNC: 119 MG/DL — SIGNIFICANT CHANGE UP

## 2021-05-04 PROCEDURE — 99232 SBSQ HOSP IP/OBS MODERATE 35: CPT

## 2021-05-04 RX ADMIN — Medication 2 MILLIGRAM(S): at 10:22

## 2021-05-04 RX ADMIN — Medication 2 MILLIGRAM(S): at 20:13

## 2021-05-04 RX ADMIN — OLANZAPINE 30 MILLIGRAM(S): 15 TABLET, FILM COATED ORAL at 20:14

## 2021-05-04 NOTE — PROGRESS NOTE BEHAVIORAL HEALTH - SUMMARY
The patient is a 36yo M, domiciled with mother; employed as a ; self-reported PPHx of depression (per [facility]CKES also schizoaffective disorder, bipolar disorder, anxiety), past admissions, denies hx of SIB/SA, hx of aggression; BIB EMS activated by parent; psychiatry consulted for psychosis. Patient was initially admitted to medical floor, where he was evaluated for seizures and workup came back negative. Patient was then admitted to [facility] for acute psychosis with catatonic features.    Patient presented with minimal response/engagement this morning but noticeable psychomotor retardation. His catatonic features are most likely attributable to his decompensated psychotic state 2/2 to primary diagnosis of Schizophrenia. He would benefit from further inpatient hospitalization for safety and stabilization.    Psychosis with catatonic features:  - Increase Zyprexa Zydis to 30mg OP QHS.  - Ativan 2mg PO TID for catatonia.    Agitation:  - Haldol 5mg PO PRN Q6 and Ativan 1mg PO PRN Q6 for agitation not responding to verbal redirection with escalation to IM should the patient refuse/become an acute danger to himself/others. EKG to follow.    Anxiety/Insomnia:  - Atarax 50mg PO QHS for anxiety/insomnia. The patient is a 36yo M, domiciled with mother; employed as a ; self-reported PPHx of depression (per PS[facility]ES also schizoaffective disorder, bipolar disorder, anxiety), past admissions, denies hx of SIB/SA, hx of aggression; BIB EMS activated by parent; psychiatry consulted for psychosis. Patient was initially admitted to medical floor, where he was evaluated for seizures and workup came back negative. Patient was then admitted to [facility] for acute psychosis with catatonic features.    Patient presented with minimal response/engagement this morning but noticeable psychomotor retardation. Later in the day, patient showed good response to Benzodiazepines, which is encouraging. His catatonic features are most likely attributable to his decompensated psychotic state 2/2 to primary diagnosis of Schizophrenia. He would benefit from further inpatient hospitalization for safety and stabilization.    Psychosis with catatonic features:  - Increase Zyprexa Zydis to 30mg OP QHS.  - Ativan 2mg PO TID for catatonia.    Agitation:  - Haldol 5mg PO PRN Q6 and Ativan 1mg PO PRN Q6 for agitation not responding to verbal redirection with escalation to IM should the patient refuse/become an acute danger to himself/others. EKG to follow.    Anxiety/Insomnia:  - Atarax 50mg PO QHS for anxiety/insomnia. The patient is a 34yo M, domiciled with mother; employed as a ; self-reported PPHx of depression (per PSYCKES also schizoaffective disorder, bipolar disorder, anxiety), past admissions, denies hx of SIB/SA, hx of aggression; BIB EMS activated by parent; psychiatry consulted for psychosis. Patient was initially admitted to medical floor, where he was evaluated for seizures and workup came back negative. Patient was then admitted to [facility] for acute psychosis with catatonic features.    Patient presented with minimal response/engagement this morning but noticeable psychomotor retardation. Later in the day, patient showed good response to Benzodiazepines, which is encouraging. His catatonic features are most likely attributable to his decompensated psychotic state 2/2 to primary diagnosis of Schizophrenia. He would benefit from further inpatient hospitalization for safety and stabilization.    Psychosis with catatonic features:  - Continue Zyprexa Zydis to 30mg OP QHS.  - Ativan 2mg PO TID for catatonia.    Agitation:  - Haldol 5mg PO PRN Q6 and Ativan 1mg PO PRN Q6 for agitation not responding to verbal redirection with escalation to IM should the patient refuse/become an acute danger to himself/others. EKG to follow.    Anxiety/Insomnia:  - Atarax 50mg PO QHS for anxiety/insomnia.

## 2021-05-04 NOTE — PROGRESS NOTE BEHAVIORAL HEALTH - OTHER
unable to assess did not verbalize response defer not responding psychomotor retardation eyes closed, appears asleep patient remains sleeping, difficult to wake up, not engaging in the interview mostly keeps his eyes closed

## 2021-05-04 NOTE — PROGRESS NOTE BEHAVIORAL HEALTH - NSBHFUPINTERVALHXFT_PSY_A_CORE
Patient was seen this morning. Chart was reviewed and no acute events were reported overnight. As per staff and chart review, patient spent the entirety of yesterday in bed and has had continuously poor PO intake. Patient has been compliant with medications.    Today, he once again is difficult to arouse from sleep. He turns over slowly toward the team, opens his eyes minimally, but still does not engage/give any verbal responses. Patient was seen this morning. Chart was reviewed and no acute events were reported overnight. As per staff and chart review, patient spent the entirety of yesterday in bed and has had continuously poor PO intake. Patient has been compliant with medications.    Today, he once again is difficult to arouse from sleep. He turns over slowly toward the team, opens his eyes minimally, but still does not engage/give any verbal responses.    UPDATE:  Patient received IM Ativan in the late morning. Upon entering the room about 1hr later, patient was awake, standing up in his room, not doing anything. He was easy to engage in conversation and well-related. He reported "fine" mood and denies suicidal/homicidal ideations and A/V hallucinations. Patient was seen this morning. Chart was reviewed and no acute events were reported overnight. As per staff and chart review, patient spent the entirety of yesterday in bed and has had continuously poor PO intake. Patient has been compliant with medications.    Today, he once again is difficult to arouse from sleep. He turns over slowly toward the team, opens his eyes minimally, but still does not engage/give any verbal responses.    UPDATE:  Patient received IM Ativan in the late morning. Upon entering the room about 1hr later, patient was awake, standing up in his room, not doing anything. He was easy to engage in conversation and well-related. He reported "fine" mood and denies suicidal/homicidal ideations and A/V hallucinations.      Spoke with his mother, Constance this afternoon. She recaps that patient was diagnosed with mental illness 10yrs ago (heard voices, catatonic). He was stable on Zyprexa 20mg for 10yrs. Over the course of last year, patient had experienced several deaths in the family including his father, his uncle, and his grandmother, which his mother believes precipitated this decompensation that began in February, 2021. He was hospitalized twice at [facility], then discharged (on Invega shot). 1 week ago patient began suddenly pacing nonstop and was not responding to verbal stimuli and would not "snap out of it." At this point patient was readmitted to [facility], now at [facility].  Substance use - patient has been smoking cigarettes for years; stopped after most recent [facility] admission a couple weeks ago.  SH - patient had IEP and received accommodations in school, later got his GED, became .

## 2021-05-05 LAB — TSH SERPL-MCNC: 0.99 UIU/ML — SIGNIFICANT CHANGE UP (ref 0.27–4.2)

## 2021-05-05 PROCEDURE — 99232 SBSQ HOSP IP/OBS MODERATE 35: CPT

## 2021-05-05 RX ORDER — FLUPHENAZINE HYDROCHLORIDE 1 MG/1
5 TABLET, FILM COATED ORAL
Refills: 0 | Status: DISCONTINUED | OUTPATIENT
Start: 2021-05-05 | End: 2021-05-13

## 2021-05-05 RX ADMIN — Medication 2 MILLIGRAM(S): at 12:34

## 2021-05-05 RX ADMIN — OLANZAPINE 30 MILLIGRAM(S): 15 TABLET, FILM COATED ORAL at 20:22

## 2021-05-05 RX ADMIN — Medication 2 MILLIGRAM(S): at 20:22

## 2021-05-05 RX ADMIN — FLUPHENAZINE HYDROCHLORIDE 5 MILLIGRAM(S): 1 TABLET, FILM COATED ORAL at 20:22

## 2021-05-05 RX ADMIN — Medication 2 MILLIGRAM(S): at 08:09

## 2021-05-05 NOTE — PROGRESS NOTE BEHAVIORAL HEALTH - OTHER
defer not responding unable to assess psychomotor retardation patient remains sleeping, difficult to wake up, not engaging in the interview eyes closed, appears asleep did not verbalize response mostly keeps his eyes closed

## 2021-05-05 NOTE — CHART NOTE - NSCHARTNOTEFT_GEN_A_CORE
Social Work Note:    Treatment team attempted to interview patient on unit rounds this morning.  Patient did not engage with treatment team at that time.  Later in the morning treatment team was informed by patient’s nurse that he was awake and able to participate in an interview.  Patient informed that he takes some time to wake up in the morning.  He elaborated by explaining his routine when he is home and he takes his time to start his day.      During the day patient has been observed to be isolative to his room.  He was encouraged to be visible on the unit, sit in the day room and engage with other peers.  Patient explained that he prefers to be alone and that he feels this way when he is at home to not be around other people.  “Don’t you feel like being alone sometimes” he asked.  Patient explained that when he is at work and when he is around family that those are his social interactions.  At other times he prefers to be by himself.      Team meeting to be held with patient tomorrow to discuss treatment plan, medications and discharge plan. Patient was encouraged to speak with his mother today as she has expressed concern regarding his wellness.      Mental Status Exam:    Mood – Neutral   Sleep – Good   Appetite – Fair   ADLs – Fair   Thought Process – Other   Observation – [id number]    No barriers to discharge identified at this time.     At this time patient is not psychiatrically stable for discharge.

## 2021-05-05 NOTE — PROGRESS NOTE BEHAVIORAL HEALTH - SUMMARY
The patient is a 36yo M, domiciled with mother; employed as a ; self-reported PPHx of depression (per [facility] also schizoaffective disorder, bipolar disorder, anxiety), past admissions, denies hx of SIB/SA, hx of aggression; BIB EMS activated by parent; psychiatry consulted for psychosis. Patient was initially admitted to medical floor, where he was evaluated for seizures and workup came back negative. Patient was then admitted to [facility] for acute psychosis with catatonic features.    Patient presented with minimal response/engagement this morning but noticeable psychomotor retardation. Later in the day, patient showed good response to Benzodiazepines, which is encouraging. His catatonic features are most likely attributable to his decompensated psychotic state 2/2 to primary diagnosis of Schizophrenia. He would benefit from further inpatient hospitalization for safety and stabilization.    Psychosis with catatonic features:  - Continue Zyprexa Zydis 30mg OP QHS.  - Ativan 2mg PO TID for catatonia.    Agitation:  - Haldol 5mg PO PRN Q6 and Ativan 1mg PO PRN Q6 for agitation not responding to verbal redirection with escalation to IM should the patient refuse/become an acute danger to himself/others. EKG to follow.    Anxiety/Insomnia:  - Atarax 50mg PO QHS for anxiety/insomnia. The patient is a 34yo M, domiciled with mother; employed as a ; self-reported PPHx of depression (per PS[facility]ES also schizoaffective disorder, bipolar disorder, anxiety), past admissions, denies hx of SIB/SA, hx of aggression; BIB EMS activated by parent; psychiatry consulted for psychosis. Patient was initially admitted to medical floor, where he was evaluated for seizures and workup came back negative. Patient was then admitted to [facility] for acute psychosis with catatonic features.    Patient presented with minimal response/engagement this morning but noticeable psychomotor retardation. Later in the day, patient showed good response to Benzodiazepines, which is encouraging. His catatonic features are most likely attributable to his decompensated psychotic state 2/2 to primary diagnosis of Schizophrenia. He would benefit from further inpatient hospitalization for safety and stabilization.    Psychosis with catatonic features:  - Continue Zyprexa Zydis 30mg OP QHS.  - Start Prolixin 5 mg BID as pt responded to it in the past  - Ativan 2mg PO BID for catatonia.    Agitation:  - Haldol 5mg PO PRN Q6 and Ativan 1mg PO PRN Q6 for agitation not responding to verbal redirection with escalation to IM should the patient refuse/become an acute danger to himself/others. EKG to follow.    Anxiety/Insomnia:  - Atarax 50mg PO QHS for anxiety/insomnia.

## 2021-05-05 NOTE — PROGRESS NOTE BEHAVIORAL HEALTH - NSBHFUPINTERVALHXFT_PSY_A_CORE
Pt was seen, evaluated, chart reviewed.  As per nursing staff, no events overnight. On evaluation, pt was seen in his room, in bed. Pt would not open his eyes or engage in interview.   Several minutes after leaving pt's room, a nurse saw pt standing up. She told him that the doctor was in the room and wanted to speak with him, Pt responded "I know, I heard. I will speak to her later." Pt has been compliant with medication, denies negative side effects. Denied suicidal/homicidal ideation, intent or plan. Pt denied any COVID related symptoms. Pt was seen, evaluated, chart reviewed.  As per nursing staff, no events overnight. On evaluation, pt was seen in his room, in bed. Pt would not open his eyes or engage in interview.   Several minutes after leaving pt's room, a nurse saw pt standing up. She told him that the doctor was in the room and wanted to speak with him, Pt responded "I know, I heard. I will speak to her later." Pt has been compliant with medication, denies negative side effects. Denied suicidal/homicidal ideation, intent or plan. Pt denied any COVID related symptoms.    Addendum:  Pt was observed to be behaving odd. Pt walked to the phone 3 times however did not make a phone call. He returned to his room, was observed to be jumping from the hallway to his room, peeking into the bathroom and then back into the room. When approached, pt was denying hearing voices however has a delay in answering questions and appeared internally preoccupied.

## 2021-05-05 NOTE — PROGRESS NOTE BEHAVIORAL HEALTH - INSIGHT (REGARDING PSYCHIATRIC ILLNESS)
Hematology/Oncology Note Name: Radha Cloud 
Date: 5/3/2020 : 1941 Sinai Choudhary DO Subjective: Chief Complaint:  Follow up anemia History of Present Illness:  
Mr Colletta Clap is here today for follow-up of his anemia. He received 2nd dose of Injectafer on 2020. He reported he's feeling better and has a good week. His appetite has been improving. He will schedule with GI for follow-up. Since stopped taking iron pills, he did not notice further dark stool. Denies any obvious bleeding, melena, bloody stools, hematochezia, nausea, vomiting, or abdominal pain. Denied worsen dyspnea, severe headache, chest pain, cough, abdominal pain, nausea, vomiting, diarrhea, dysuria, new focal weakness or numbness. His EGD and Colonoscopy on 19 showed hiatal hernia, gastritis, ascending colon polyp; diverticulosis; and hemorroids. History Mr. Colletta Clap is a most pleasant 78y.o. year old male who was seen for anemia. He has multiple co morbidities which includes of chronic atrial fibrillation, peripheral vascular disease, hypertension, coronary artery disease, pulmonary hypertension, CHF exacerbation, chronic pain syndrome, iron deficiency anemia, chronic kidney disease, and benign prostate hypertrophy. He was diagnosed with iron deficiency anemia since last year and has been on iron pills without any tolerating issues. On May 2019 he had bloody stools and was diagnosed with GI bleeding. His EGD/colonoscopy with polypectomy on 19 reported hiatal hernia, mild erosive gastritis few flecks of coffee grounds,1 cm colon polyp, diverticulosis, internal hemorrhoids. He has been on PPI since then. He received blood transfusion/IV iron during his hospitalization. For his iron deficiency anemia, he has received 4 doses of iron IV- Injectafer given by his GI doctor, last dose was on 10/15/2019.  He has followed up with GI recently and no further procedure planned. He has history of chronic A-fib but was hold off anticoagulation due to GI bleeding issues. He has been on home oxygen at 2-3 L for his COPD. Oncologic History: No history exists. Past Medical History, Family History, and Social History: 
 
Past Medical History:  
Diagnosis Date  Aneurysm ([facility] [address] AAA repair 2006 & 2012  Aorto-iliac duplex 02/13/2015 Tech difficult. Patent aorta bi-iliac endograft w/o leak or limb dysfunction.  Arrhythmia   
 a fib  Cardiac cath 11/01/2012 RCA (sm, nondom) patent. LM patent. LAD 25%. CX (dom) 45% mid. LVEDP 12 mmHg. No WMA. PA 27/12. W 10-12. CO/CI 5.2/2.6 (TD).  Cardiac echocardiogram, abnormal 02/20/2016 EF 55%. No WMA. Indeterminate diastolic fx. RVSP 60 mmHg. Severe LAE. Mild MR. Mod TR.  IVCE. Similar to study of 10/26/12.  Cardiovascular aorto-iliac duplex 02/13/2015 Patent aorta bi-iliac endovascular graft w/o leak or limb dysfunction. Sac measures 4.21 x 4.47 cm (4.4 x 4.6 cm on 1/31/14).  Cardiovascular LE peripheral arterial testing 07/29/2013 No significant LE arterial disease bilaterally. R GHADA 1. 12.  L GHADA 1. 12.  R DBI 0.83. L DBI 0.71. Exercise deferred.  Cardiovascular renal duplex 10/31/2012 No RA stenosis. Intrinsic/med disease in left kidney. Patent aortic endograft. Patent, thrombus-free renal veins bilaterally.  Carotid duplex 07/29/2013 Mild <50% bilateral ICA plaquing.  Chronic kidney disease  Chronic lung disease  Cigarette smoker  Congestive heart failure (CHF) ([facility] [address]  COPD (chronic obstructive pulmonary disease) (HCC)   
 and emphysema; likely secondary to tobacco abuse  Difficult intubation  Dyslipidemia   
 low HDL  Emphysema   
 HTN (hypertension)  Hypercholesterolemia  Ill-defined condition   
 hernia  Increased prostate specific antigen (PSA) velocity  Long term (current) use of anticoagulants   
 coumadin  Osteoarthritis  Osteomyelitis ([facility] [address]  Paroxysmal atrial fibrillation (HCC)  Peripheral vascular disease ([facility] [address] AAA repair 12/2007  Persistent atrial Fibrillation  Persistent atrial fibrillation  Unspecified adverse effect of anesthesia   
 difficulty breathing placed in ICU Oct 2012 (AAA repair) Past Surgical History:  
Procedure Laterality Date  BRONCHOSCOPY DIAGNOSTIC  11/2/2012  CARDIAC CATHETERIZATION  11/1/2012  COLONOSCOPY N/A 7/26/2016 COLONOSCOPY with Polypectomies performed by Vernal Goodpasture, MD at [facility] ENDOSCOPY  COLONOSCOPY N/A 5/28/2019 COLONOSCOPY with polypectomy performed by Chapo Santo MD at [address] HX AAA REPAIR    
 2006 & 2012  HX HEART CATHETERIZATION  3/4/2009 1. RCA small, nondominant; patent. 2. LMCA patent. 3. LAD is long, wrapped around apical vessel. Diffuse, 20-30% stenosis noted. 4. CCA is large, dominant vessel. Diffuse 20-30% stenosis. 5. LVEDP is 16 mmHg. 6. Overall left ventricular systolic function mildly diminshed with est. EF 45%. Mild, global hypokinesis noted. 7. No significant mitral regurgitation or aortic stenosis noted. (see report)  HX HERNIA REPAIR  2/2014  
 rt inguinal   
 HX HERNIA REPAIR  01/2016 LEFT INGUINAL HERNIA REPAIR DR. Light Crow  REPAIR ING HERNIA,5+Y/O,JESSIE Left 1-20-16 Dr. Kala LONG  11/1/2012 Social History Socioeconomic History  Marital status:  Spouse name: Not on file  Number of children: Not on file  Years of education: Not on file  Highest education level: Not on file Occupational History  Not on file Social Needs  Financial resource strain: Not on file  Food insecurity Worry: Not on file Inability: Not on file  Transportation needs Medical: Not on file Non-medical: Not on file Tobacco Use  Smoking status: Former Smoker Packs/day: 1.50 Years: 54.00 Pack years: 81.00 Types: Cigarettes Last attempt to quit: 10/23/2012 Years since quittin.5  Smokeless tobacco: Never Used Substance and Sexual Activity  Alcohol use: No  
  Alcohol/week: 0.0 standard drinks Comment: quit drinking alcohol 26 years ago, patient states stopped in \"8541\"  Drug use: No  
 Sexual activity: Not Currently Lifestyle  Physical activity Days per week: Not on file Minutes per session: Not on file  Stress: Not on file Relationships  Social connections Talks on phone: Not on file Gets together: Not on file Attends [employer] service: Not on file Active member of club or organization: Not on file Attends meetings of clubs or organizations: Not on file Relationship status: Not on file  Intimate partner violence Fear of current or ex partner: Not on file Emotionally abused: Not on file Physically abused: Not on file Forced sexual activity: Not on file Other Topics Concern  Not on file Social History Narrative  Not on file Family History Problem Relation Age of Onset  Heart Surgery Father [facility]  Stroke Father  Heart Disease Father  Hypertension Father  Heart Attack Father  Heart Surgery Mother [facility]  Coronary Artery Disease Mother  Stroke Mother  Heart Disease Mother  Hypertension Mother  Diabetes Sister  Cancer Brother Current Outpatient Medications Medication Sig Dispense Refill  cephalexin 500 mg tab  ciprofloxacin HCl (CIPRO) 500 mg tablet  albuterol (PROVENTIL VENTOLIN) 2.5 mg /3 mL (0.083 %) nebu inhale contents of 1 vial in nebulizer every 4 hours if needed for wheezing 50 Nebule 5  
 tamsulosin (FLOMAX) 0.4 mg capsule Take 0.4 mg by mouth daily.  multivitamin, tx-iron-ca-min (THERA-M W/ IRON) 9 mg iron-400 mcg tab tablet Take 1 Tab by mouth daily.  sodium chloride (OCEAN NASAL) 0.65 % nasal squeeze bottle 2 Sprays by Both Nostrils route daily as needed for Congestion.  tiotropium-olodaterol (STIOLTO RESPIMAT) 2.5-2.5 mcg/actuation inhaler inhale 2 inhalations by mouth once daily 1 Inhaler 5  
 docusate sodium 100 mg tab Take 100 mg by mouth daily as needed for Other (constipation).  ferrous sulfate 325 mg (65 mg iron) tablet Take 325 mg by mouth every other day. Indications: anemia from inadequate iron  pantoprazole (PROTONIX) 40 mg tablet Take 1 Tab by mouth Daily (before breakfast). 30 Tab 0  
 HYDROcodone-acetaminophen (NORCO) 5-325 mg per tablet Take 0.5 Tabs by mouth two (2) times daily as needed for Pain.  traZODone (DESYREL) 50 mg tablet Take 50 mg by mouth nightly.  lisinopril (PRINIVIL, ZESTRIL) 20 mg tablet Take 1 Tab by mouth daily. 30 Tab 0  pravastatin (PRAVACHOL) 40 mg tablet Take 40 mg by mouth nightly.  polyethylene glycol (MIRALAX) 17 gram packet Take 17 g by mouth daily as needed (constipation).  digoxin (DIGITEK) 0.125 mg tablet take 1 tablet by mouth once daily 90 Tab 3  
 amLODIPine (NORVASC) 10 mg tablet Take 10 mg by mouth daily. 0  
 OXYGEN-AIR DELIVERY SYSTEMS 2 L/min by Nasal route daily. Continuous. Company is [employer] Review of Systems: 
Constitutional: The patient has no acute distress or discomfort. HEENT: The patient denies recent head trauma, eye pain, blurred vision,  hearing deficit, oropharyngeal mucosal pain or lesions, and the patient denies throat pain or discomfort. Lymphatics: The patient denies palpable peripheral lymphadenopathy. Hematologic: The patient denies having bruising, bleeding. Respiratory: Patient denies having shortness of breath, cough, sputum production, fever. Cardiovascular: The patient denies having leg pain, leg swelling, heart palpitations, chest permit, or chest pain. The patient denies having progressive dyspnea on exertion. Gastrointestinal: The patient denies having nausea, emesis, or diarrhea. The patient denies having any hematemesis or blood in the stool. Genitourinary: Patient denies having urinary urgency, frequency, or dysuria. The patient denies having blood in the urine. Psychological: The patient denies having symptoms of nervousness, anxiety, depression, or thoughts of harming self. Skin: Patient denies having skin rashes, skin, ulcerations, or unexplained itching or pruritus. Musculoskeletal: The patient denies having pain in the joints or bones. Objective: There were no vitals taken for this visit. Physical Exam:  
Gen. Appearance: The patient is in no acute distress. On Home Oxygen 2L. Skin: There is no bruise or rash. HEENT: The exam is unremarkable. Neck: Supple without lymphadenopathy or thyromegaly. Lungs: Clear to auscultation and percussion; there are no wheezes or rhonchi. Heart: Regular rate and rhythm; there are no murmurs, gallops, or rubs. Abdomen: Bowel sounds are present and normal.  There is no guarding, tenderness, or hepatosplenomegaly. Extremities: There is no clubbing, cyanosis, or edema. Neurologic: There are no focal neurologic deficits. Lymphatics: There is no palpable peripheral lymphadenopathy. Musculoskeletal: The patient has full range of motion at all joints. There is no evidence of joint deformity or effusions. There is no focal joint tenderness. Psychological/psychiatric: There is no clinical evidence of anxiety, depression, or melancholy. Diagnostics: 
   
Results for orders placed or performed during the hospital encounter of 01/16/20 CBC WITH 3 PART DIFF     Status: Abnormal  
Result Value Ref Range Status  WBC 9.8 4.5 - 13.0 K/uL Final  
 RBC 2.55 (L) 4.10 - 5.10 M/uL Final  
 HGB 7.3 (L) 12.0 - 16 g/dL Final  
 HCT 22.9 (L) 36 - 48 % Final  
 MCV 89.8 78 - 102 FL Final  
 MCH 28.6 25.0 - 35.0 PG Final  
 MCHC 31.9 31 - 37 g/dL Final  
 RDW 13.5 11.5 - 14.5 % Final  
 PLATELET 449 424 - 449 K/uL Final  
 NEUTROPHILS 80 (H) 40 - 70 % Final  
 MIXED CELLS 9 0.1 - 17 % Final  
 LYMPHOCYTES 11 (L) 14 - 44 % Final  
 ABS. NEUTROPHILS 7.8 1.8 - 9.5 K/UL Final  
 ABS. MIXED CELLS 0.9 0.0 - 2.3 K/uL Final  
 ABS. LYMPHOCYTES 1.1 1.1 - 5.9 K/UL Final  
  Comment: Test performed at [address] [place] [address] or Outpatient Infusion Center Location. Reviewed by Medical Director. DF AUTOMATED   Final  
 
 
Recent Results (from the past 2016 hour(s)) CREATININE, POC Collection Time: 02/17/20 10:46 AM  
Result Value Ref Range Creatinine, POC 1.0 0.6 - 1.3 MG/DL  
 GFRAA, POC >60 >60 ml/min/1.73m2 GFRNA, POC >60 >60 ml/min/1.73m2 CBC WITH AUTOMATED DIFF Collection Time: 03/05/20  3:00 PM  
Result Value Ref Range WBC 8.3 4.6 - 13.2 K/uL  
 RBC 3.31 (L) 4.70 - 5.50 M/uL HGB 9.3 (L) 13.0 - 16.0 g/dL HCT 30.5 (L) 36.0 - 48.0 % MCV 92.1 74.0 - 97.0 FL  
 MCH 28.1 24.0 - 34.0 PG  
 MCHC 30.5 (L) 31.0 - 37.0 g/dL  
 RDW 16.9 (H) 11.6 - 14.5 % PLATELET 456 227 - 714 K/uL MPV 9.4 9.2 - 11.8 FL  
 NEUTROPHILS 78 (H) 40 - 73 % LYMPHOCYTES 13 (L) 21 - 52 % MONOCYTES 9 3 - 10 % EOSINOPHILS 0 0 - 5 % BASOPHILS 0 0 - 2 %  
 ABS. NEUTROPHILS 6.5 1.8 - 8.0 K/UL  
 ABS. LYMPHOCYTES 1.1 0.9 - 3.6 K/UL  
 ABS. MONOCYTES 0.7 0.05 - 1.2 K/UL  
 ABS. EOSINOPHILS 0.0 0.0 - 0.4 K/UL  
 ABS. BASOPHILS 0.0 0.0 - 0.1 K/UL  
 DF AUTOMATED FERRITIN Collection Time: 03/05/20  3:00 PM  
Result Value Ref Range Ferritin 842 (H) 8 - 388 NG/ML  
IRON PROFILE Collection Time: 03/05/20  3:00 PM  
Result Value Ref Range Iron 43 (L) 50 - 175 ug/dL TIBC 283 250 - 450 ug/dL Iron % saturation 15 (L) 20 - 50 % Assessment and Plan: # Chronic Normocytic Anemia 
-- He has hx iron deficiency anemia diagnosed last year and had been on iron pills. -- He has follow-up with Gastroenterology for GI bleeding. EGD/Colonoscopy on 5/28/19 showed hiatal hernia, gastritis, ascending colon polyp; diverticulosis; and hemorroids. -- 9/25/19 labs reported Iron 26, Iron saturation 10%, Ferritin 429, and TIBC 259. Has received IV Iron  - Injectafer x 4 for symptomatic iron deficiency anemia. -- 11/7/19 labs showed Ferritin 772. MCV improved to 91. Vitamin B12/ Folate, and SPEP reviewed WNL. Schistocytes noted on smear but low reticulocyte production index - RPI (inadequate response to correct the anemia) and negative for hemolytic panels (, Hapto 208, KYLAH neg). -- His chronic anemia likely multifactorial from iron deficiency anemia and anemia of chronic kidney disease. -- Received Injectafer x 2. Last dose of Injactafer was on 02/11/2020 Clinically improving with less fatigue and better appetite. He will call his Gastroenterology for a follow-up. -- CBC on 03/05/2020 shows WBC of 9.3, H/H 9.3/30.5,  K, ferritin 842, iron 42, TIBC 283, iron sat 15%. -- The patient was advised to seek a prompt medical care if any obvious bleeding or worsening symptoms or any concerns. Plan: 
-- Will check his labs today: CBC and Iron study, ferrtin. He will be notified if any intervention needed. -- We will see him back in 2 months for follow-up. Always sooner if required. Anup Gamez All of patient's questions answered to their apparent satisfaction. They verbally show understanding and agreement with the plan. About 26 minutes were spent for this encounter with more than 50% of the time spent in face-to-face counseling and discussing on diagnosis and management plan going forward. Kailyn Rodríguez NP 
5/7/2020 
  
  
  
I have assessed the patient independently and agree with the full assessment as outlined. 
  
Alondra Louise MD 
5/8/2020 Parts of this document has been produced using Dragon dictation system. Unrecognized errors in transcription may be present. Please do not hesitate to reach out for any questions or clarifications.  
 
 
 
 
CC: Bennie Arias, DO 
   
   
  
 
 
 
 
  
   
   
   
   
   
   
   
   
   
   
   
   
   
 
 
 
 
 
 Poor

## 2021-05-06 DIAGNOSIS — F41.9 ANXIETY DISORDER, UNSPECIFIED: ICD-10-CM

## 2021-05-06 DIAGNOSIS — R41.82 ALTERED MENTAL STATUS, UNSPECIFIED: ICD-10-CM

## 2021-05-06 DIAGNOSIS — F32.9 MAJOR DEPRESSIVE DISORDER, SINGLE EPISODE, UNSPECIFIED: ICD-10-CM

## 2021-05-06 DIAGNOSIS — F17.200 NICOTINE DEPENDENCE, UNSPECIFIED, UNCOMPLICATED: ICD-10-CM

## 2021-05-06 DIAGNOSIS — Z87.898 PERSONAL HISTORY OF OTHER SPECIFIED CONDITIONS: ICD-10-CM

## 2021-05-06 DIAGNOSIS — F20.2 CATATONIC SCHIZOPHRENIA: ICD-10-CM

## 2021-05-06 DIAGNOSIS — Z91.14 PATIENT'S OTHER NONCOMPLIANCE WITH MEDICATION REGIMEN: ICD-10-CM
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
## 2021-05-06 DIAGNOSIS — R56.9 UNSPECIFIED CONVULSIONS: ICD-10-CM

## 2021-05-06 DIAGNOSIS — Z63.4 DISAPPEARANCE AND DEATH OF FAMILY MEMBER: ICD-10-CM

## 2021-05-06 LAB — COPPER SERPL-MCNC: 89 UG/DL — SIGNIFICANT CHANGE UP (ref 69–132)

## 2021-05-06 PROCEDURE — 99231 SBSQ HOSP IP/OBS SF/LOW 25: CPT

## 2021-05-06 RX ADMIN — Medication 2 MILLIGRAM(S): at 20:16

## 2021-05-06 RX ADMIN — FLUPHENAZINE HYDROCHLORIDE 5 MILLIGRAM(S): 1 TABLET, FILM COATED ORAL at 20:14

## 2021-05-06 RX ADMIN — Medication 2 MILLIGRAM(S): at 08:18

## 2021-05-06 RX ADMIN — FLUPHENAZINE HYDROCHLORIDE 5 MILLIGRAM(S): 1 TABLET, FILM COATED ORAL at 08:17

## 2021-05-06 RX ADMIN — OLANZAPINE 30 MILLIGRAM(S): 15 TABLET, FILM COATED ORAL at 20:13

## 2021-05-06 SDOH — SOCIAL STABILITY - SOCIAL INSECURITY: DISSAPEARANCE AND DEATH OF FAMILY MEMBER: Z63.4

## 2021-05-06 NOTE — PROGRESS NOTE BEHAVIORAL HEALTH - SUMMARY
The patient is a 34yo M, domiciled with mother; employed as a ; self-reported PPHx of depression (per PSYCKES also schizoaffective disorder, bipolar disorder, anxiety), past admissions, denies hx of SIB/SA, hx of aggression; BIB EMS activated by parent; psychiatry consulted for psychosis. Patient was initially admitted to medical floor, where he was evaluated for seizures and workup came back negative. Patient was then admitted to [facility] for acute psychosis with catatonic features.    Patient presented today with some improvement in catatonic symptoms. He continues to report/display psychotic symptoms and would benefit from further inpatient hospitalization for safety and stabilization.    Psychosis with catatonic features:  - Continue Zyprexa Zydis 30mg PO QHS.  - Continue Prolixin 5 mg BID as pt responded to it in the past (started 5/5).  - Continue Ativan 2mg PO BID for catatonia.    Agitation:  - Haldol 5mg PO PRN Q6 and Ativan 1mg PO PRN Q6 for agitation not responding to verbal redirection with escalation to IM should the patient refuse/become an acute danger to himself/others. EKG to follow.    Anxiety/Insomnia:  - Atarax 50mg PO QHS for anxiety/insomnia.

## 2021-05-06 NOTE — PROGRESS NOTE BEHAVIORAL HEALTH - OTHER
patient awakes to verbal stimuli, appears less catatonic today; sits up for interview some increased latency at times Denies any currently, but mentions still hearing voices at times and questions if he has been experiencing VH as well.

## 2021-05-06 NOTE — PROGRESS NOTE BEHAVIORAL HEALTH - NSBHFUPINTERVALHXFT_PSY_A_CORE
Patient was seen and examined this morning. Chart was reviewed, and no acute events were reported overnight by staff. As per nursing, patient has been eating better and out of bed more.    Patient was lying in bed this morning, woke up to verbal stimuli. He declined to attend the treatment team meeting, stating, "I'm just really not in the mood right now." The team instead came to speak with him in his room. He states that he feels "fine." When asked why he hasn't called his mother yet, he explained that "this is hard on her too and I didn't want to upset her more." He seems more agreeable to calling her today after further discussion. Patient endorses sleeping and eating consistently. He denies any suicidal/homicidal ideations.    He has been compliant with medications, but reports increased tiredness and asks if "visual hallucinations are a side effect?" He goes on to explain that he feels like he might be seeing things ("like people moving on the floor") in his room, but he is unsure, saying, "they might just be shadows." With regards to AH, he reports that he still hears the voices intermittently, but not at present time. He says, "they mostly talk amongst themselves," but sometimes they will talk to him and he will "not pay attention."

## 2021-05-07 PROCEDURE — 99231 SBSQ HOSP IP/OBS SF/LOW 25: CPT

## 2021-05-07 PROCEDURE — 73130 X-RAY EXAM OF HAND: CPT | Mod: 26,50

## 2021-05-07 RX ORDER — ACETAMINOPHEN 500 MG
650 TABLET ORAL EVERY 6 HOURS
Refills: 0 | Status: DISCONTINUED | OUTPATIENT
Start: 2021-05-07 | End: 2021-05-27

## 2021-05-07 RX ADMIN — FLUPHENAZINE HYDROCHLORIDE 5 MILLIGRAM(S): 1 TABLET, FILM COATED ORAL at 20:43

## 2021-05-07 RX ADMIN — Medication 2 MILLIGRAM(S): at 20:43

## 2021-05-07 RX ADMIN — OLANZAPINE 30 MILLIGRAM(S): 15 TABLET, FILM COATED ORAL at 20:44

## 2021-05-07 RX ADMIN — Medication 2 MILLIGRAM(S): at 08:11

## 2021-05-07 RX ADMIN — FLUPHENAZINE HYDROCHLORIDE 5 MILLIGRAM(S): 1 TABLET, FILM COATED ORAL at 08:11

## 2021-05-07 NOTE — CHART NOTE - NSCHARTNOTEFT_GEN_A_CORE
-received a call by RN to evaluate pts hands    -pt punched the wall with closed fists   -pt reports pain on there right hand > left  -pt denies numbness or tingling    Exam:  Right hand- range of motion intact, hand  5/5 , abduction and adduction intact. abrasion on Knuckles, tenderness , ecchymoses and , edema on  3rd distal metacarpophalangeal and interphalangeal , capillary refill intact, sensation intact, radial pulse present  Left hand- abrasion on index finger , mild tenderness, range of motion,  intact,  capillary refill intact, sensation intact, radial pulse present    Plan  -cold compress  -keep hands elevated  -x-ray bilateral hands   -pain meds prn   -constant observation

## 2021-05-07 NOTE — CHART NOTE - NSCHARTNOTEFT_GEN_A_CORE
Social Work Note:    Treatment team met with patient earlier today on unit rounds.  Patient was able to engage but only offered minimal answers on interview. There were also pauses before his responses.  Patient appeared to be internally preoccupied. He had blood on a hand towel in his room.  Patient explained that he fell in the bathroom on his hands.  Treatment team observed where patient punched the wall.     During the day patient has been observed to be isolative to his room.  He was encouraged to be visible on the unit, sit in the day room and engage with other peers.  Patient prefers to be alone in his room and has no interest in group attendance.  He has been in contact with his mother.      Team meeting held with patient yesterday.  At that time goal of making five reality based statements discussed.  Patient was agreeable to this goal.      Mental Status Exam:    Mood – Neutral   Sleep – Good   Appetite – Fair   ADLs – Fair   Thought Process – Other   Observation – [id number]    No barriers to discharge identified at this time.     At this time patient is not psychiatrically stable for discharge.

## 2021-05-07 NOTE — PROGRESS NOTE BEHAVIORAL HEALTH - OTHER
Denies any currently, but mentions still hearing voices at times and questions if he has been experiencing VH as well. some increased latency at times

## 2021-05-07 NOTE — PROGRESS NOTE BEHAVIORAL HEALTH - NSBHFUPINTERVALHXFT_PSY_A_CORE
Pt was seen, evaluated, chart reviewed.  As per nursing staff, no events overnight. In the morning, another peer reported to staff that mar was getting agitated in his room. On evaluation, pt reports he is "ok." A napkin with blood stains was observed to be sitting in his room, when asked about what happened, pt states "he fell." Writer examined pt's hands and there was blood noted on several knuckles, bilaterally. Pt states he "fell" on his knuckles. There is also an indent noticed on the wall, surrounded by a blood stain, making it seem that pt punched the wall. When confronted with this, pt didn't say anything. Would not reveal what made him angry or why he punched the wall. Denied any pain in his hand, refused pain medication. Will notify PA.   Is compliant with medication, denies negative side effects. States "I don't deserve to be here." Pt appears internally preoccupied however states he is "not hearing anything." Denied auditory/visual hallucinations. Denied suicidal/homicidal ideation, intent or plan. Pt denied any COVID related symptoms.

## 2021-05-07 NOTE — PROGRESS NOTE BEHAVIORAL HEALTH - SUMMARY
The patient is a 36yo M, domiciled with mother; employed as a ; self-reported PPHx of depression (per PSYCKES also schizoaffective disorder, bipolar disorder, anxiety), past admissions, denies hx of SIB/SA, hx of aggression; BIB EMS activated by parent; psychiatry consulted for psychosis. Patient was initially admitted to medical floor, where he was evaluated for seizures and workup came back negative. Patient was then admitted to [facility] for acute psychosis with catatonic features.    Patient presented today with some improvement in catatonic symptoms. He continues to report/display psychotic symptoms and would benefit from further inpatient hospitalization for safety and stabilization.    Psychosis with catatonic features:  - Continue Zyprexa Zydis 30mg PO QHS.  - Continue Prolixin 5 mg BID as pt responded to it in the past (started 5/5).  - Continue Ativan 2mg PO BID for catatonia, catatonia has improved    Agitation:  - Haldol 5mg PO PRN Q6 and Ativan 1mg PO PRN Q6 for agitation not responding to verbal redirection with escalation to IM should the patient refuse/become an acute danger to himself/others. EKG to follow.    Anxiety/Insomnia:  - Atarax 50mg PO QHS for anxiety/insomnia.

## 2021-05-08 RX ADMIN — OLANZAPINE 30 MILLIGRAM(S): 15 TABLET, FILM COATED ORAL at 20:31

## 2021-05-08 RX ADMIN — FLUPHENAZINE HYDROCHLORIDE 5 MILLIGRAM(S): 1 TABLET, FILM COATED ORAL at 20:31

## 2021-05-08 RX ADMIN — Medication 2 MILLIGRAM(S): at 20:31

## 2021-05-08 RX ADMIN — Medication 2 MILLIGRAM(S): at 08:09

## 2021-05-08 RX ADMIN — FLUPHENAZINE HYDROCHLORIDE 5 MILLIGRAM(S): 1 TABLET, FILM COATED ORAL at 08:07

## 2021-05-08 NOTE — CHART NOTE - NSCHARTNOTEFT_GEN_A_CORE
pt was seen in bed, alert, comfortable, NAD  COVID-PCR samples taken from b/l nostrils  pt tolerated procedure well  fup result  monitor vss  monitor pt

## 2021-05-09 LAB — SARS-COV-2 RNA SPEC QL NAA+PROBE: SIGNIFICANT CHANGE UP

## 2021-05-09 RX ADMIN — FLUPHENAZINE HYDROCHLORIDE 5 MILLIGRAM(S): 1 TABLET, FILM COATED ORAL at 08:15

## 2021-05-09 RX ADMIN — FLUPHENAZINE HYDROCHLORIDE 5 MILLIGRAM(S): 1 TABLET, FILM COATED ORAL at 20:12

## 2021-05-09 RX ADMIN — OLANZAPINE 30 MILLIGRAM(S): 15 TABLET, FILM COATED ORAL at 20:12

## 2021-05-09 RX ADMIN — Medication 2 MILLIGRAM(S): at 20:11

## 2021-05-09 RX ADMIN — Medication 2 MILLIGRAM(S): at 08:15

## 2021-05-10 PROCEDURE — 99231 SBSQ HOSP IP/OBS SF/LOW 25: CPT

## 2021-05-10 RX ADMIN — Medication 2 MILLIGRAM(S): at 08:14

## 2021-05-10 RX ADMIN — Medication 1 MILLIGRAM(S): at 20:36

## 2021-05-10 RX ADMIN — FLUPHENAZINE HYDROCHLORIDE 5 MILLIGRAM(S): 1 TABLET, FILM COATED ORAL at 08:14

## 2021-05-10 RX ADMIN — OLANZAPINE 30 MILLIGRAM(S): 15 TABLET, FILM COATED ORAL at 20:34

## 2021-05-10 RX ADMIN — FLUPHENAZINE HYDROCHLORIDE 5 MILLIGRAM(S): 1 TABLET, FILM COATED ORAL at 20:34

## 2021-05-10 NOTE — PROGRESS NOTE BEHAVIORAL HEALTH - SUMMARY
The patient is a 34yo M, domiciled with mother; employed as a ; self-reported PPHx of depression (per [facility] also schizoaffective disorder, bipolar disorder, anxiety), past admissions, denies hx of SIB/SA, hx of aggression; BIB EMS activated by parent; psychiatry consulted for psychosis. Patient was initially admitted to medical floor, where he was evaluated for seizures and workup came back negative. Patient was then admitted to [facility] for acute psychosis with catatonic features.    Patient continues to show improvement in catatonic and psychotic symptoms, but does not appear to be back at his baseline functioning yet. He would benefit from further inpatient hospitalization for safety and stabilization.    Psychosis with catatonic features:  - Continue Zyprexa Zydis 30mg PO QHS.  - Continue Prolixin 5 mg BID as pt responded to it in the past (started 5/5).  - Decrease Ativan to 1mg PO BID as catatonia is improving    Agitation:  - Haldol 5mg PO PRN Q6 and Ativan 1mg PO PRN Q6 for agitation not responding to verbal redirection with escalation to IM should the patient refuse/become an acute danger to himself/others. EKG to follow.    Anxiety/Insomnia:  - Atarax 50mg PO QHS for anxiety/insomnia.

## 2021-05-10 NOTE — PROGRESS NOTE BEHAVIORAL HEALTH - NSBHFUPINTERVALHXFT_PSY_A_CORE
Patient was seen and interviewed today. Chart was reviewed and no acute events were reported overnight. As per nursing staff, patient remained very quiet over the weekend.    Today, patient is awake and standing in the dark room, about to eat his lunch. He reports feeling "find" and endorses some boredom over the weekend. He denies hearing voices at present and cannot recall the last time he heard them. He remains quiet but appears engage in the conversation. He has been taking the medications, but mentions that "there are a lot." He reports feeling "tired," "groggy," and "more depressed" after taking them usually.  He denies any suicidal/homicidal ideations or paranoid thoughts today. He reports that he tried calling his mother yesterday but could not get through to her.    Called Constance, patient's mother. She reports last speaking with him on Saturday. She said he denied hearing voices to her as well, but is not sure if she can believe him. She continues to express a lot of worry/anxiety with regard to her son.

## 2021-05-10 NOTE — CHART NOTE - NSCHARTNOTEFT_GEN_A_CORE
Social Work Note:    Treatment team met with patient earlier today on unit rounds.  Patient did not engage at that time. During the day patient has been isolative to his room.  Treatment team encouraged patient to be visible on the unit, sit in the day room and engage with peers.  Per patient, he prefers to be alone in his room.  Group attendance declined.      Earlier today this worker spoke with patient’s mother [phone].  She provided her cell as an alternate number [phone].  Last contact between mother and patient was on Saturday 05/08.      Team meeting held with patient last week.  At that time goal of making five reality based statements discussed.  Patient was agreeable to this goal.      Mental Status Exam:    Mood – Neutral   Sleep – Good   Appetite – Fair   ADLs – Fair   Thought Process – Other   Observation – [id number]    No barriers to discharge identified at this time.     At this time patient is not psychiatrically stable for discharge. Social Work Note:    Treatment team met with patient earlier today on unit rounds.  Patient did not engage at that time. During the day patient has been isolative to his room.  Treatment team encouraged patient to be visible on the unit, sit in the day room and engage with peers.  Per patient, he prefers to be alone in his room.  Group attendance declined.  Patient has been adherent to prescribed medications.  Suicidal / homicidal ideation denied.      Earlier today this worker spoke with patient’s mother [phone].  She provided her cell as an alternate number [phone].  Last contact between mother and patient was on Saturday 05/08.      Team meeting held with patient last week.  At that time goal of making five reality based statements discussed.  Patient was agreeable to this goal.      Mental Status Exam:    Mood – Neutral   Sleep – Good   Appetite – Fair   ADLs – Fair   Thought Process – Other   Observation – [id number]    No barriers to discharge identified at this time.     At this time patient is not psychiatrically stable for discharge.

## 2021-05-11 PROCEDURE — 99231 SBSQ HOSP IP/OBS SF/LOW 25: CPT

## 2021-05-11 RX ORDER — FLUPHENAZINE HYDROCHLORIDE 1 MG/1
5 TABLET, FILM COATED ORAL ONCE
Refills: 0 | Status: COMPLETED | OUTPATIENT
Start: 2021-05-11 | End: 2021-05-11

## 2021-05-11 RX ADMIN — FLUPHENAZINE HYDROCHLORIDE 5 MILLIGRAM(S): 1 TABLET, FILM COATED ORAL at 20:28

## 2021-05-11 RX ADMIN — FLUPHENAZINE HYDROCHLORIDE 5 MILLIGRAM(S): 1 TABLET, FILM COATED ORAL at 12:41

## 2021-05-11 RX ADMIN — Medication 1 MILLIGRAM(S): at 20:28

## 2021-05-11 RX ADMIN — OLANZAPINE 30 MILLIGRAM(S): 15 TABLET, FILM COATED ORAL at 20:28

## 2021-05-11 NOTE — PROGRESS NOTE BEHAVIORAL HEALTH - NSBHFUPINTERVALHXFT_PSY_A_CORE
Patient was seen and interviewed today. Chart was reviewed and no acute events were reported overnight. As per nursing staff, patient remained isolative but catatonic symptoms have improved and patient is eating more.    Today, patient is awake and sitting in the dark room. He reports feeling "fine" He denies hearing voices at present and cannot recall the last time he heard them, but he does say, "it's difficult to explain." With some encouragement, he does admit to hearing them occasionally still and that they make him "anxious." He remains quiet but appears engage in the conversation.  He denies any suicidal/homicidal ideations or paranoid thoughts today.

## 2021-05-11 NOTE — PROGRESS NOTE BEHAVIORAL HEALTH - SUMMARY
The patient is a 34yo M, domiciled with mother; employed as a ; self-reported PPHx of depression (per [facility] also schizoaffective disorder, bipolar disorder, anxiety), past admissions, denies hx of SIB/SA, hx of aggression; BIB EMS activated by parent; psychiatry consulted for psychosis. Patient was initially admitted to medical floor, where he was evaluated for seizures and workup came back negative. Patient was then admitted to [facility] for acute psychosis with catatonic features.    Patient continues to show improvement in catatonic symptoms, but remains psychotic. He would benefit from further inpatient hospitalization for safety and stabilization.    Psychosis with catatonic features:  - Continue Zyprexa Zydis 30mg PO QHS.  - Continue Prolixin 5 mg BID as pt responded to it in the past (started 5/5).  - Continue Ativan 1mg PO BID as catatonia is improving    Agitation:  - Haldol 5mg PO PRN Q6 and Ativan 1mg PO PRN Q6 for agitation not responding to verbal redirection with escalation to IM should the patient refuse/become an acute danger to himself/others. EKG to follow.    Anxiety/Insomnia:  - Atarax 50mg PO QHS for anxiety/insomnia.

## 2021-05-12 LAB — SARS-COV-2 RNA SPEC QL NAA+PROBE: SIGNIFICANT CHANGE UP

## 2021-05-12 PROCEDURE — 99231 SBSQ HOSP IP/OBS SF/LOW 25: CPT

## 2021-05-12 RX ADMIN — OLANZAPINE 30 MILLIGRAM(S): 15 TABLET, FILM COATED ORAL at 20:27

## 2021-05-12 RX ADMIN — Medication 1 MILLIGRAM(S): at 20:28

## 2021-05-12 RX ADMIN — FLUPHENAZINE HYDROCHLORIDE 5 MILLIGRAM(S): 1 TABLET, FILM COATED ORAL at 20:27

## 2021-05-12 RX ADMIN — Medication 2 MILLIGRAM(S): at 13:43

## 2021-05-12 NOTE — PROGRESS NOTE BEHAVIORAL HEALTH - NSBHFUPINTERVALHXFT_PSY_A_CORE
Pt was seen, evaluated, chart reviewed.  As per nursing staff, no events overnight. Pt took his medications last night with some encouragement. On evaluation, pt was initially laying in bed with eyes closed. He would not respond to the treatment team and would not take his medications.    Is compliant with medication, denies negative side effects. Endorsed good sleep and appetite. He remains internally preoccupied and isolative to his room. Pt is guarded and does not reveal any of his symptoms. Denied suicidal/homicidal ideation, intent or plan. Pt denied any COVID related symptoms. Pt was seen, evaluated, chart reviewed.  As per nursing staff, no events overnight. Pt took his medications last night with some encouragement. On evaluation, pt was initially laying in bed with eyes closed. He would not respond to the treatment team and would not take his medications.   In the afternoon, pt continues to lay in bed, not responding to treatment team. He remains internally preoccupied and isolative to his room. Pt is guarded and does not reveal any of his symptoms. Denied suicidal/homicidal ideation, intent or plan. Pt denied any COVID related symptoms.

## 2021-05-12 NOTE — PROGRESS NOTE BEHAVIORAL HEALTH - SUMMARY
The patient is a 34yo M, domiciled with mother; employed as a ; self-reported PPHx of depression (per [facility] also schizoaffective disorder, bipolar disorder, anxiety), past admissions, denies hx of SIB/SA, hx of aggression; BIB EMS activated by parent; psychiatry consulted for psychosis. Patient was initially admitted to medical floor, where he was evaluated for seizures and workup came back negative. Patient was then admitted to [facility] for acute psychosis with catatonic features.    Patient continues to show improvement in catatonic symptoms, but remains psychotic. He would benefit from further inpatient hospitalization for safety and stabilization.    Psychosis with catatonic features:  - Continue Zyprexa Zydis 30mg PO QHS.  - Continue Prolixin 5 mg BID as pt responded to it in the past (started 5/5).  - Continue Ativan 1mg PO BID as catatonia is improving    Agitation:  - Haldol 5mg PO PRN Q6 and Ativan 1mg PO PRN Q6 for agitation not responding to verbal redirection with escalation to IM should the patient refuse/become an acute danger to himself/others. EKG to follow.    Anxiety/Insomnia:  - Atarax 50mg PO QHS for anxiety/insomnia.

## 2021-05-12 NOTE — CHART NOTE - NSCHARTNOTEFT_GEN_A_CORE
Social Work Note:    Treatment team met with patient earlier today on unit rounds.  Patient did not engage at that time. During the day patient remains isolative to his room.  Treatment team encourages patient to be visible on the unit, sit in the day room and engage with peers.  Patient has previously advised that he prefers to stay alone in his room.  He declines group attendance. At times patient appears to be internally preoccupied. Patient has been adherent to prescribed medications.  Suicidal / homicidal ideation denied.      Earlier today this worker spoke with patient’s mother [phone].  She informed that patient has maintained contact with her with their last conversation on Monday 05/10.       Team meeting to be held with patient tomorrow. At that time goal of making five reality based statements discussed.  Patient was agreeable to this goal.      Mental Status Exam:    Mood – Neutral   Sleep – Good   Appetite – Fair   ADLs – Fair   Thought Process – Other   Observation – [id number]    No barriers to discharge identified at this time.     At this time patient is not psychiatrically stable for discharge. Social Work Note:    Treatment team met with patient earlier today on unit rounds.  Patient did not engage at that time. During the day patient remains isolative to his room.  Treatment team encourages patient to be visible on the unit, sit in the day room and engage with peers.  Patient has previously advised that he prefers to stay alone in his room.  He declines group attendance. At times patient appears to be internally preoccupied. Patient has been adherent to prescribed medications.  Suicidal / homicidal ideation denied.      Earlier today this worker spoke with patient’s mother [phone].  She informed that patient has maintained contact with her with their last conversation on Monday 05/10.       Team meeting to be held with patient tomorrow. At that time goal of making five reality based statements to be discussed.       Mental Status Exam:    Mood – Neutral   Sleep – Good   Appetite – Fair   ADLs – Fair   Thought Process – Other   Observation – [id number]    No barriers to discharge identified at this time.     At this time patient is not psychiatrically stable for discharge.

## 2021-05-13 PROCEDURE — 99231 SBSQ HOSP IP/OBS SF/LOW 25: CPT

## 2021-05-13 RX ORDER — FLUPHENAZINE HYDROCHLORIDE 1 MG/1
10 TABLET, FILM COATED ORAL
Refills: 0 | Status: DISCONTINUED | OUTPATIENT
Start: 2021-05-13 | End: 2021-05-27

## 2021-05-13 RX ADMIN — FLUPHENAZINE HYDROCHLORIDE 5 MILLIGRAM(S): 1 TABLET, FILM COATED ORAL at 08:10

## 2021-05-13 RX ADMIN — Medication 2 MILLIGRAM(S): at 20:47

## 2021-05-13 RX ADMIN — FLUPHENAZINE HYDROCHLORIDE 10 MILLIGRAM(S): 1 TABLET, FILM COATED ORAL at 20:45

## 2021-05-13 RX ADMIN — OLANZAPINE 30 MILLIGRAM(S): 15 TABLET, FILM COATED ORAL at 20:45

## 2021-05-13 RX ADMIN — Medication 1 MILLIGRAM(S): at 08:11

## 2021-05-13 NOTE — PROGRESS NOTE BEHAVIORAL HEALTH - NSBHFUPINTERVALHXFT_PSY_A_CORE
Patient was seen and interviewed today. Chart was reviewed and no acute events were reported overnight. As per nursing staff, patient remained isolative but catatonic symptoms have improved and patient is eating more. He took his medications with no issues this morning.    Today, patient is awake and lying down in his bed in the dark. He reports feeling "fine." He denies hearing voices at present, but is a little more open with the treatment team this morning, admitting that he has been hearing them still daily. He reports that it can be difficult to hear us when he is hearing them simultaneously. He reports that they mostly talk amongst themselves and not directly to him, but it does continue to make him anxious at times. He denies any suicidal/homicidal ideations or paranoid thoughts today. Patient was seen and interviewed today. Chart was reviewed and no acute events were reported overnight. As per nursing staff, patient remained isolative but catatonic symptoms have improved and patient is eating more. He took his medications with no issues this morning.    Today, patient is awake and lying down in his bed in the dark. He reports feeling "fine." He denies hearing voices at present, but is a little more open with the treatment team this morning, admitting that he has been hearing them still daily. He reports that it can be difficult to hear us when he is hearing them simultaneously. He reports that they mostly talk amongst themselves and not directly to him, but it does continue to make him anxious at times. He denies any suicidal/homicidal ideations or paranoid thoughts today.    Spoke with mother, Constance today. She spoke with Delon yesterday evening and reports that he wasn't in a good mood and that he wants to come home. She reports that he continues to endorse hearing voices to her as well. She was educated more on how to talk to Dleon about the voices, and encouraged not to focus on them not being real but encouraged to better understand his experience.

## 2021-05-13 NOTE — PROGRESS NOTE BEHAVIORAL HEALTH - SUMMARY
The patient is a 36yo M, domiciled with mother; employed as a ; self-reported PPHx of depression (per [facility] also schizoaffective disorder, bipolar disorder, anxiety), past admissions, denies hx of SIB/SA, hx of aggression; BIB EMS activated by parent; psychiatry consulted for psychosis. Patient was initially admitted to medical floor, where he was evaluated for seizures and workup came back negative. Patient was then admitted to [facility] for acute psychosis with catatonic features.    Patient continues to show improvement in catatonic symptoms, but remains psychotic. He would benefit from further inpatient hospitalization for safety and stabilization.    Psychosis with catatonic features:  - Continue Zyprexa Zydis 30mg PO QHS.  - Continue Prolixin 5 mg BID as pt responded to it in the past (started 5/5).  - Continue Ativan 1mg PO BID as catatonia is improving    Agitation:  - Haldol 5mg PO PRN Q6 and Ativan 1mg PO PRN Q6 for agitation not responding to verbal redirection with escalation to IM should the patient refuse/become an acute danger to himself/others. EKG to follow.    Anxiety/Insomnia:  - Atarax 50mg PO QHS for anxiety/insomnia. The patient is a 34yo M, domiciled with mother; employed as a ; self-reported PPHx of depression (per [facility] also schizoaffective disorder, bipolar disorder, anxiety), past admissions, denies hx of SIB/SA, hx of aggression; BIB EMS activated by parent; psychiatry consulted for psychosis. Patient was initially admitted to medical floor, where he was evaluated for seizures and workup came back negative. Patient was then admitted to [facility] for acute psychosis with catatonic features.    Patient continues to show improvement in catatonic symptoms, but he remains psychotic (endorsing AH and appearing internally preoccupied throughout the day). He would benefit from further inpatient hospitalization for safety and stabilization.    Psychosis with catatonic features:  - Continue Zyprexa Zydis 30mg PO QHS.  - Increase Prolixin to 10mg BID, as pt responded to it in the past (increase on 5/13).  - Continue Ativan 1mg PO in the morning, and increase bedtime dose back to 2mg for continued catatonic symptoms.    Agitation:  - Haldol 5mg PO PRN Q6 and Ativan 1mg PO PRN Q6 for agitation not responding to verbal redirection with escalation to IM should the patient refuse/become an acute danger to himself/others. EKG to follow.    Anxiety/Insomnia:  - Atarax 50mg PO QHS for anxiety/insomnia.

## 2021-05-14 PROCEDURE — 99231 SBSQ HOSP IP/OBS SF/LOW 25: CPT

## 2021-05-14 RX ADMIN — Medication 2 MILLIGRAM(S): at 20:56

## 2021-05-14 RX ADMIN — Medication 1 MILLIGRAM(S): at 08:56

## 2021-05-14 RX ADMIN — FLUPHENAZINE HYDROCHLORIDE 10 MILLIGRAM(S): 1 TABLET, FILM COATED ORAL at 20:56

## 2021-05-14 RX ADMIN — FLUPHENAZINE HYDROCHLORIDE 10 MILLIGRAM(S): 1 TABLET, FILM COATED ORAL at 08:56

## 2021-05-14 RX ADMIN — OLANZAPINE 30 MILLIGRAM(S): 15 TABLET, FILM COATED ORAL at 20:56

## 2021-05-14 NOTE — PROGRESS NOTE BEHAVIORAL HEALTH - SUMMARY
The patient is a 36yo M, domiciled with mother; employed as a ; self-reported PPHx of depression (per [facility] also schizoaffective disorder, bipolar disorder, anxiety), past admissions, denies hx of SIB/SA, hx of aggression; BIB EMS activated by parent; psychiatry consulted for psychosis. Patient was initially admitted to medical floor, where he was evaluated for seizures and workup came back negative. Patient was then admitted to [facility] for acute psychosis with catatonic features.    Patient continues to show improvement in catatonic symptoms, but he remains psychotic (endorsing AH and appearing internally preoccupied throughout the day). He would benefit from further inpatient hospitalization for safety and stabilization.    Psychosis with catatonic features:  - Continue Zyprexa Zydis 30mg PO QHS.  - Continue Prolixin 10mg BID, as pt responded to it in the past (increase on 5/13).  - Continue Ativan 1mg PO in the morning, and increase bedtime dose back to 2mg for continued catatonic symptoms.    Agitation:  - Haldol 5mg PO PRN Q6 and Ativan 1mg PO PRN Q6 for agitation not responding to verbal redirection with escalation to IM should the patient refuse/become an acute danger to himself/others. EKG to follow.    Anxiety/Insomnia:  - Atarax 50mg PO QHS for anxiety/insomnia.

## 2021-05-14 NOTE — CHART NOTE - NSCHARTNOTEFT_GEN_A_CORE
Social Work Note:    Patient does not always engage with treatment team on unit morning rounds.  Later in the day patient has been able to engage with treatment team and participate in an interview.  He has been adherent to prescribed medications.  When patient engages he is pleasant but only offers minimal answers to questions.  During the day patient remains isolative to his room.  Treatment team encourages patient to be visible on the unit, sit in the day room and engage with peers.  Patient has previously advised that he prefers to stay alone in his room.  He declines group attendance. At times patient appears to be internally preoccupied. Patient has been adherent to prescribed medications.  Suicidal / homicidal ideation denied.      Communication with patient’s mother ongoing with last contact on 05/12.  She informed that patient has maintained contact with her.         Team meeting held yesterday with patient. At that time goal of making five reality based statements discussed.  Patient was agreeable to this goal.      Mental Status Exam:    Mood – Neutral   Sleep – Good   Appetite – Fair   ADLs – Fair   Thought Process – Other   Observation – [id number]    No barriers to discharge identified at this time.     At this time patient is not psychiatrically stable for discharge. Social Work Note:    Patient does not always engage with treatment team on unit morning rounds.  Later in the day patient has been able to engage with treatment team and participate in an interview.  He has been adherent to prescribed medications.  When patient engages he is pleasant but only offers minimal answers to questions.  During the day patient remains isolative to his room.  Treatment team encourages patient to be visible on the unit, sit in the day room and engage with peers.  Patient has previously advised that he prefers to stay alone in his room.  He declines group attendance. At times patient appears to be internally preoccupied. Patient has been adherent to prescribed medications.  Suicidal / homicidal ideation denied.      Communication with patient’s mother ongoing with last contact on 05/12.  She informed that patient has maintained contact with her.         Team meeting held yesterday with patient. At that time goal of making five reality based statements discussed.  Patient was agreeable to this goal.      Yesterday this worker collaborated with patient's outpatient therapist Clifton Jensen [phone] of [facility].  Communication with this outpatient mental health provider to continue to update outpatient team of patient's clinical status.  Patient is also seen by a psychiatrist at this clinic.     Mental Status Exam:    Mood – Neutral   Sleep – Good   Appetite – Fair   ADLs – Fair   Thought Process – Other   Observation – [id number]    No barriers to discharge identified at this time.     At this time patient is not psychiatrically stable for discharge.

## 2021-05-14 NOTE — PROGRESS NOTE BEHAVIORAL HEALTH - NSBHFUPINTERVALHXFT_PSY_A_CORE
Pt was seen, evaluated, chart reviewed.  As per nursing staff, no events overnight. On evaluation, pt was in bed, laying with eyes closed, not responding to treatment team, not waking up to take medications.   In the afternoon, pt was re-assessed,   Is compliant with medication, denies negative side effects. Endorsed good sleep and appetite. Continues to hear intermittent auditory hallucinations. Appears internally preoccupied at times, guarded, with thought blocking. Denied suicidal/homicidal ideation, intent or plan. Pt was seen, evaluated, chart reviewed.  As per nursing staff, no events overnight. On evaluation, pt was in bed, laying with eyes closed, not responding to treatment team, not waking up to take medications.     In the afternoon, pt was re-assessed, he woke up, states he is feeling "ok" and was "resting" earlier. Reports he has not been hearing any voices today. Is compliant with medication, denies negative side effects. Endorsed good sleep and appetite. States he spoke with his mother yesterday. Denied auditory/visual hallucinations. Denied suicidal/homicidal ideation, intent or plan.

## 2021-05-14 NOTE — PROGRESS NOTE BEHAVIORAL HEALTH - NSBHADMITIPBHPROVFT_PSY_A_CORE
as per interval history
as per interval history
n/a
as per interval history
n/a
as per interval history
n/a
as per interval history

## 2021-05-15 PROCEDURE — 73120 X-RAY EXAM OF HAND: CPT | Mod: 26,RT

## 2021-05-15 RX ADMIN — FLUPHENAZINE HYDROCHLORIDE 10 MILLIGRAM(S): 1 TABLET, FILM COATED ORAL at 20:05

## 2021-05-15 RX ADMIN — OLANZAPINE 30 MILLIGRAM(S): 15 TABLET, FILM COATED ORAL at 20:05

## 2021-05-15 RX ADMIN — HALOPERIDOL DECANOATE 5 MILLIGRAM(S): 100 INJECTION INTRAMUSCULAR at 20:06

## 2021-05-15 RX ADMIN — Medication 2 MILLIGRAM(S): at 20:05

## 2021-05-15 RX ADMIN — FLUPHENAZINE HYDROCHLORIDE 10 MILLIGRAM(S): 1 TABLET, FILM COATED ORAL at 08:01

## 2021-05-15 RX ADMIN — Medication 1 MILLIGRAM(S): at 08:01

## 2021-05-15 NOTE — CHART NOTE - NSCHARTNOTEFT_GEN_A_CORE
Call to evaluate this patient that punched the wall .  PE :  b/l  hand pip excoriation and brusing  cold compress.  hand x ray to f/u

## 2021-05-16 RX ADMIN — Medication 1 MILLIGRAM(S): at 08:02

## 2021-05-16 RX ADMIN — Medication 2 MILLIGRAM(S): at 20:18

## 2021-05-16 RX ADMIN — FLUPHENAZINE HYDROCHLORIDE 10 MILLIGRAM(S): 1 TABLET, FILM COATED ORAL at 08:02

## 2021-05-16 RX ADMIN — FLUPHENAZINE HYDROCHLORIDE 10 MILLIGRAM(S): 1 TABLET, FILM COATED ORAL at 20:18

## 2021-05-16 RX ADMIN — OLANZAPINE 30 MILLIGRAM(S): 15 TABLET, FILM COATED ORAL at 20:18

## 2021-05-17 PROCEDURE — 99231 SBSQ HOSP IP/OBS SF/LOW 25: CPT

## 2021-05-17 RX ORDER — SERTRALINE 25 MG/1
50 TABLET, FILM COATED ORAL DAILY
Refills: 0 | Status: DISCONTINUED | OUTPATIENT
Start: 2021-05-17 | End: 2021-05-25

## 2021-05-17 RX ORDER — IBUPROFEN 200 MG
400 TABLET ORAL ONCE
Refills: 0 | Status: COMPLETED | OUTPATIENT
Start: 2021-05-17 | End: 2021-05-17

## 2021-05-17 RX ADMIN — Medication 1 MILLIGRAM(S): at 09:18

## 2021-05-17 RX ADMIN — Medication 2 MILLIGRAM(S): at 20:07

## 2021-05-17 RX ADMIN — OLANZAPINE 30 MILLIGRAM(S): 15 TABLET, FILM COATED ORAL at 20:07

## 2021-05-17 RX ADMIN — FLUPHENAZINE HYDROCHLORIDE 10 MILLIGRAM(S): 1 TABLET, FILM COATED ORAL at 20:07

## 2021-05-17 RX ADMIN — SERTRALINE 50 MILLIGRAM(S): 25 TABLET, FILM COATED ORAL at 16:04

## 2021-05-17 RX ADMIN — Medication 400 MILLIGRAM(S): at 23:31

## 2021-05-17 RX ADMIN — FLUPHENAZINE HYDROCHLORIDE 10 MILLIGRAM(S): 1 TABLET, FILM COATED ORAL at 09:17

## 2021-05-17 NOTE — PROGRESS NOTE BEHAVIORAL HEALTH - SUMMARY
The patient is a 34yo M, domiciled with mother; employed as a ; self-reported PPHx of depression (per PS[facility] also schizoaffective disorder, bipolar disorder, anxiety), past admissions, denies hx of SIB/SA, hx of aggression; BIB EMS activated by parent; psychiatry consulted for psychosis. Patient was initially admitted to medical floor, where he was evaluated for seizures and workup came back negative. Patient was then admitted to [facility] for acute psychosis with catatonic features.    Patient continues to be withdrawn with little poor engagement and worsening in depressive symptoms. He would benefit from further inpatient hospitalization for safety and stabilization.    Psychosis with catatonic features:  - Continue Zyprexa Zydis 30mg PO QHS.  - Continue Prolixin 10mg BID, as pt responded to it in the past (increase on 5/13).  - Continue Ativan 1mg PO in the morning, and continue bedtime dose of 2mg for continued catatonic symptoms.    Depression:   - Start Zoloft 50mg PO QD.    Agitation:  - Haldol 5mg PO PRN Q6 and Ativan 1mg PO PRN Q6 for agitation not responding to verbal redirection with escalation to IM should the patient refuse/become an acute danger to himself/others. EKG to follow.    Anxiety/Insomnia:  - Atarax 50mg PO QHS for anxiety/insomnia.

## 2021-05-17 NOTE — PROGRESS NOTE BEHAVIORAL HEALTH - NSBHFUPINTERVALHXFT_PSY_A_CORE
Patient was seen and interviewed this morning and afternoon. Chart was reviewed. As per nursing staff, patient again punched the wall in his room this weekend (5/15/21). X-ray's were done, no acute fractures appreciated.    Patient was sleeping this morning, and somewhat difficult to arouse but did wake up to verbal stimulus. He reported feeling "tired" and "depressed" today. When asked about the wall, he replies, "oh that was nothing," and doesn't elaborate further. He reports not hearing any voices for the last 2 days. He remains withdrawn and guarded overall on interview. He denies any suicidal/homicidal ideations.    Spoke with his mother, Constance. She last spoke with him yesterday afternoon. She reports that he sounded "down" at that time.

## 2021-05-17 NOTE — ED PROVIDER NOTE - EMPLOYMENT
soft/nontender/no distention/no masses palpable/bowel sounds normal/no bruit/no rebound tenderness/no guarding/no rigidity N/A

## 2021-05-17 NOTE — PROGRESS NOTE BEHAVIORAL HEALTH - OTHER
"tired" "depressed" denies any voices currently during interview poverty of speech withdrawn; in bed most of the day

## 2021-05-17 NOTE — CHART NOTE - NSCHARTNOTEFT_GEN_A_CORE
Social Work Note:    Treatment team met with patient earlier today on unit rounds.  At time of assessment patient was only minimally engaged.  He offered short responses to questions on interview.  Treatment team noticed patient’s wall had blood where it was previously repaired from when he punched the wall last week. During the day patient remains isolative to his room.  Treatment team encourages patient to be visible on the unit, sit in the day room and engage with peers.      At times patient appears to be internally preoccupied. Patient has been adherent to prescribed medications.  Suicidal / homicidal ideation denied.      Communication with patient’s mother Constance [phone] ongoing with last contact earlier today.  She informed that patient has maintained contact with her with their last conversation yesterday 05/16.         Team meeting held last week with patient. At that time goal of making five reality based statements discussed.  Patient was agreeable to this goal.      On Friday 05/14 this worker collaborated with patient's outpatient therapist Clifton Jensen [phone] of [facility].  Communication with this outpatient mental health provider to continue to update outpatient team of patient's clinical status.  Patient is also seen by a psychiatrist at this clinic.     Mental Status Exam:    Mood – Neutral   Sleep – Good   Appetite – Fair   ADLs – Fair   Thought Process – Other   Observation – [id number]    No barriers to discharge identified at this time.     At this time patient is not psychiatrically stable for discharge.

## 2021-05-18 PROCEDURE — 99231 SBSQ HOSP IP/OBS SF/LOW 25: CPT

## 2021-05-18 RX ADMIN — Medication 400 MILLIGRAM(S): at 00:21

## 2021-05-18 RX ADMIN — Medication 1 MILLIGRAM(S): at 08:26

## 2021-05-18 RX ADMIN — SERTRALINE 50 MILLIGRAM(S): 25 TABLET, FILM COATED ORAL at 08:26

## 2021-05-18 RX ADMIN — Medication 1 MILLIGRAM(S): at 20:18

## 2021-05-18 RX ADMIN — FLUPHENAZINE HYDROCHLORIDE 10 MILLIGRAM(S): 1 TABLET, FILM COATED ORAL at 08:26

## 2021-05-18 RX ADMIN — FLUPHENAZINE HYDROCHLORIDE 10 MILLIGRAM(S): 1 TABLET, FILM COATED ORAL at 20:18

## 2021-05-18 RX ADMIN — OLANZAPINE 30 MILLIGRAM(S): 15 TABLET, FILM COATED ORAL at 20:18

## 2021-05-18 NOTE — PROGRESS NOTE BEHAVIORAL HEALTH - NSBHCHARTREVIEWLAB_PSY_A_CORE FT
Lipid Profile in AM (05.04.21 @ 08:10)    Cholesterol, Serum: 128 mg/dL    Triglycerides, Serum: 119 mg/dL    HDL Cholesterol, Serum: 41 mg/dL    Non HDL Cholesterol: 87    LDL Cholesterol Calculated: 73 mg/dL
Lipid Profile in AM (05.04.21 @ 08:10)    Cholesterol, Serum: 128 mg/dL    Triglycerides, Serum: 119 mg/dL    HDL Cholesterol, Serum: 41 mg/dL    Non HDL Cholesterol: 87    LDL Cholesterol Calculated: 73 mg/dL    A1C with Estimated Average Glucose in AM (05.04.21 @ 08:10)    A1C with Estimated Average Glucose Result: 5.4    Estimated Average Glucose: 108
13.0   5.09  )-----------( 285      ( 30 Apr 2021 06:15 )             38.6   04-30    143  |  102  |  17  ----------------------------<  106<H>  4.0   |  27  |  0.9    Ca    9.7      30 Apr 2021 06:15
Lipid Profile in AM (05.04.21 @ 08:10)    Cholesterol, Serum: 128 mg/dL    Triglycerides, Serum: 119 mg/dL    HDL Cholesterol, Serum: 41 mg/dL    Non HDL Cholesterol: 87    LDL Cholesterol Calculated: 73 mg/dL    A1C with Estimated Average Glucose in AM (05.04.21 @ 08:10)    A1C with Estimated Average Glucose Result: 5.4    Estimated Average Glucose: 108

## 2021-05-18 NOTE — PROGRESS NOTE BEHAVIORAL HEALTH - NSBHFUPINTERVALHXFT_PSY_A_CORE
Patient was seen and interviewed this morning. Chart was reviewed and no acute events were reported overnight. Patient has been compliant with medications with no report of adverse side effects.    This morning patient is sleeping but wakes up easily to verbal stimulus. Patient was seen and interviewed this morning. Chart was reviewed and no acute events were reported overnight. Patient has been compliant with medications with no report of adverse side effects.    This morning patient is sleeping but wakes up easily to verbal stimulus. He reports feeling "fine" and does not have report any adverse effects from the medications. He denies hearing any voices for the last 2 days. He also denies any suicidal/homicidal ideations or paranoid thoughts. He reports calling his mom yesterday. He continues to endorse good sleep and appetite.    Spoke with patient's mom today, who reports that the patient called her twice yesterday evening, which is new for him. She also reports that he said he was going to watch a movie after speaking with her, which was also new for him and encouraging.

## 2021-05-18 NOTE — PROGRESS NOTE BEHAVIORAL HEALTH - OTHER
withdrawn; in bed most of the day "tired" "depressed" denies any voices currently during interview poverty of speech "fine"

## 2021-05-18 NOTE — PROGRESS NOTE BEHAVIORAL HEALTH - SUMMARY
The patient is a 34yo M, domiciled with mother; employed as a ; self-reported PPHx of depression (per [facility]ES also schizoaffective disorder, bipolar disorder, anxiety), past admissions, denies hx of SIB/SA, hx of aggression; BIB EMS activated by parent; psychiatry consulted for psychosis. Patient was initially admitted to medical floor, where he was evaluated for seizures and workup came back negative. Patient was then admitted to [facility] for acute psychosis with catatonic features.    Patient continues to be withdrawn with little poor engagement and worsening in depressive symptoms. He would benefit from further inpatient hospitalization for safety and stabilization.    Psychosis with catatonic features:  - Continue Zyprexa Zydis 30mg PO QHS.  - Continue Prolixin 10mg BID, as pt responded to it in the past (increase on 5/13).  - Continue Ativan 1mg PO in the morning, and change bedtime dose to 1mg for continued catatonic symptoms.    Depression:   - Continue Zoloft 50mg PO QD.    Agitation:  - Haldol 5mg PO PRN Q6 and Ativan 1mg PO PRN Q6 for agitation not responding to verbal redirection with escalation to IM should the patient refuse/become an acute danger to himself/others. EKG to follow.    Anxiety/Insomnia:  - Atarax 50mg PO QHS for anxiety/insomnia.

## 2021-05-19 LAB — SARS-COV-2 RNA SPEC QL NAA+PROBE: SIGNIFICANT CHANGE UP

## 2021-05-19 PROCEDURE — 99231 SBSQ HOSP IP/OBS SF/LOW 25: CPT

## 2021-05-19 RX ADMIN — SERTRALINE 50 MILLIGRAM(S): 25 TABLET, FILM COATED ORAL at 08:08

## 2021-05-19 RX ADMIN — Medication 1 MILLIGRAM(S): at 20:15

## 2021-05-19 RX ADMIN — OLANZAPINE 30 MILLIGRAM(S): 15 TABLET, FILM COATED ORAL at 20:15

## 2021-05-19 RX ADMIN — FLUPHENAZINE HYDROCHLORIDE 10 MILLIGRAM(S): 1 TABLET, FILM COATED ORAL at 08:08

## 2021-05-19 RX ADMIN — Medication 1 MILLIGRAM(S): at 08:08

## 2021-05-19 RX ADMIN — FLUPHENAZINE HYDROCHLORIDE 10 MILLIGRAM(S): 1 TABLET, FILM COATED ORAL at 20:15

## 2021-05-19 NOTE — CHART NOTE - NSCHARTNOTEFT_GEN_A_CORE
Social Work Note:    Patient is seen daily by treatment team.  During interview patient offers minimal answers to questions.  He is able to inform that he “feels fine” and he has not verbalized any concerns.  During the day patient remains isolative to his room.  Treatment team encourages patient to be visible on the unit, sit in the day room and engage with peers.      Patient denies any active audio hallucinations.  He has been adherent to prescribed medications.  Suicidal / homicidal ideation denied.      Communication with patient’s mother Constance [phone] ongoing with last contact on Monday 05/17.  At that time she informed that patient has maintains frequent contact with her.          Team meeting held last week with patient. At that time goal of making five reality based statements discussed.  Patient was agreeable to this goal.      Last week this worker collaborated with patient's outpatient therapist Clifton Jensen [phone] of [facility].  Communication with this outpatient mental health provider to continue to update outpatient team of patient's clinical status.  Patient is also seen by a psychiatrist at this clinic.     Mental Status Exam:    Mood – Neutral   Sleep – Good   Appetite – Fair   ADLs – Fair   Thought Process – Other   Observation – [id number]    No barriers to discharge identified at this time.     At this time patient is not psychiatrically stable for discharge.

## 2021-05-20 PROCEDURE — 99232 SBSQ HOSP IP/OBS MODERATE 35: CPT | Mod: GC

## 2021-05-20 RX ADMIN — OLANZAPINE 30 MILLIGRAM(S): 15 TABLET, FILM COATED ORAL at 20:40

## 2021-05-20 RX ADMIN — Medication 2 MILLIGRAM(S): at 20:40

## 2021-05-20 RX ADMIN — FLUPHENAZINE HYDROCHLORIDE 10 MILLIGRAM(S): 1 TABLET, FILM COATED ORAL at 10:32

## 2021-05-20 RX ADMIN — SERTRALINE 50 MILLIGRAM(S): 25 TABLET, FILM COATED ORAL at 10:33

## 2021-05-20 RX ADMIN — FLUPHENAZINE HYDROCHLORIDE 10 MILLIGRAM(S): 1 TABLET, FILM COATED ORAL at 20:40

## 2021-05-20 RX ADMIN — Medication 1 MILLIGRAM(S): at 10:33

## 2021-05-20 NOTE — PROGRESS NOTE BEHAVIORAL HEALTH - NSBHFUPINTERVALHXFT_PSY_A_CORE
Patient was seen this morning. Chart was reviewed and no acute events were reported overnight. Patient has been compliant with medications with no report of adverse side effects.    This morning patient is sleeping and is difficult to wake up. Once awake, he does not verbally respond but does nod his head in response to some questions. He nods sideways, denying hearing any voices, and he nods vertically that he will take his medications this morning.     As per SW, he spoke with mother today, who is concerned by long pauses in patient's speech.

## 2021-05-20 NOTE — PROGRESS NOTE BEHAVIORAL HEALTH - SUMMARY
The patient is a 34yo M, domiciled with mother; employed as a ; self-reported PPHx of depression (per [facility]ES also schizoaffective disorder, bipolar disorder, anxiety), past admissions, denies hx of SIB/SA, hx of aggression; BIB EMS activated by parent; psychiatry consulted for psychosis. Patient was initially admitted to medical floor, where he was evaluated for seizures and workup came back negative. Patient was then admitted to [facility] for acute psychosis with catatonic features.    Patient presents more withdrawn today with flat affect. He is not very responsive or engaged in interview this morning, concerning for worsening of catatonic features. He would benefit from further inpatient hospitalization for safety and stabilization.    Psychosis with catatonic features:  - Continue Zyprexa Zydis 30mg PO QHS.  - Continue Prolixin 10mg BID, as pt responded to it in the past (increase on 5/13).  - Continue Ativan 1mg PO in the morning, and change bedtime dose back to 2mg for worsening catatonic symptoms.    Depression:   - Continue Zoloft 50mg PO QD.    Agitation:  - Haldol 5mg PO PRN Q6 and Ativan 1mg PO PRN Q6 for agitation not responding to verbal redirection with escalation to IM should the patient refuse/become an acute danger to himself/others. EKG to follow.    Anxiety/Insomnia:  - Atarax 50mg PO QHS for anxiety/insomnia.

## 2021-05-20 NOTE — CHART NOTE - NSCHARTNOTEFT_GEN_A_CORE
Pt seen and discussed with resident.  Denies hallucinations but remains isolative and shows thought blocking.  No overt delusions; denies s/h ideation.  Continue tx plan

## 2021-05-21 PROCEDURE — 99232 SBSQ HOSP IP/OBS MODERATE 35: CPT | Mod: GC

## 2021-05-21 RX ADMIN — SERTRALINE 50 MILLIGRAM(S): 25 TABLET, FILM COATED ORAL at 08:09

## 2021-05-21 RX ADMIN — Medication 1 MILLIGRAM(S): at 08:09

## 2021-05-21 RX ADMIN — OLANZAPINE 30 MILLIGRAM(S): 15 TABLET, FILM COATED ORAL at 20:25

## 2021-05-21 RX ADMIN — FLUPHENAZINE HYDROCHLORIDE 10 MILLIGRAM(S): 1 TABLET, FILM COATED ORAL at 08:09

## 2021-05-21 RX ADMIN — Medication 2 MILLIGRAM(S): at 20:24

## 2021-05-21 RX ADMIN — FLUPHENAZINE HYDROCHLORIDE 10 MILLIGRAM(S): 1 TABLET, FILM COATED ORAL at 20:25

## 2021-05-21 NOTE — PROGRESS NOTE BEHAVIORAL HEALTH - SUMMARY
The patient is a 36yo M, domiciled with mother; employed as a ; self-reported PPHx of depression (per PS[facility]ES also schizoaffective disorder, bipolar disorder, anxiety), past admissions, denies hx of SIB/SA, hx of aggression; BIB EMS activated by parent; psychiatry consulted for psychosis. Patient was initially admitted to medical floor, where he was evaluated for seizures and workup came back negative. Patient was then admitted to [facility] for acute psychosis with catatonic features.    Patient is more engaged and responsive today with the treatment team than yesterday. He continues to have symptoms of psychosis and would benefit from further inpatient hospitalization for safety and stabilization.    Psychosis with catatonic features:  - Continue Zyprexa Zydis 30mg PO QHS.  - Continue Prolixin 10mg BID, as pt responded to it in the past (increase on 5/13).  - Continue Ativan 1mg PO in the morning, and continue bedtime dose at 2mg for catatonic symptoms.    Depression:   - Continue Zoloft 50mg PO QD.    Agitation:  - Haldol 5mg PO PRN Q6 and Ativan 1mg PO PRN Q6 for agitation not responding to verbal redirection with escalation to IM should the patient refuse/become an acute danger to himself/others. EKG to follow.    Anxiety/Insomnia:  - Atarax 50mg PO QHS for anxiety/insomnia.

## 2021-05-21 NOTE — PROGRESS NOTE BEHAVIORAL HEALTH - NSBHFUPINTERVALHXFT_PSY_A_CORE
Patient was seen this morning. Chart was reviewed and no acute events were reported overnight. As per nursing, patient remains mostly isolative to his room. Patient has been compliant with medications with no report of adverse side effects.    This morning patient is sleeping but wakes up more easily today. He reports feeling "fine." He endorses hearing some voices briefly yesterday, but denies any so far today. He denies any suicidal/homicidal ideations or paranoid thoughts. He reports eating and sleeping well. He is encouraged to leave his room today and attend groups if possible.    Spoke to his mother, Constance, today. She reports yesterday that he was pausing a lot on the phone, and she suspected he was likely hearing voices. She understands and agrees with the decision to keep him in [facility] over the weekend for continued monitoring and treatment.

## 2021-05-21 NOTE — CHART NOTE - NSCHARTNOTEFT_GEN_A_CORE
Social Work Note:    Treatment team met with patient earlier today on unit rounds.  At time of assessment patient was cooperative with interview.  He endorsed feeling fine and did not verbalize any concerns.  Initially patient responded to question of hearing voices as last day that he heard voices was yesterday. A minute later he retracted this statement and stated that he had not heard voices yesterday.  During the day patient remains isolative to his room.  Treatment team encourages patient to be visible on the unit, sit in the day room and engage with peers. He has been adherent to prescribed medications.  Suicidal / homicidal ideation denied.      Communication with patient’s mother Constance [phone] ongoing with last contact yesterday 05/20.  At that time she informed that patient now has more frequent contact with her.  She was concerned that patient may still be hearing voices as she informed there are sometimes long pauses between his responses during their conversation.           Team meeting held yesterday with patient. At that time goal of making five reality based statements discussed.  Patient was agreeable to this goal.      Earlier this week this worker collaborated with patient's outpatient therapist Clifton Jensen [phone] of [facility].  Communication with this outpatient mental health provider to continue to update outpatient team of patient's clinical status.  Patient is also seen by a psychiatrist at this clinic.     Mental Status Exam:    Mood – Neutral   Sleep – Good   Appetite – Fair   ADLs – Fair   Thought Process – Other   Observation – [id number]    No barriers to discharge identified at this time.     At this time patient is not psychiatrically stable for discharge.

## 2021-05-22 RX ADMIN — FLUPHENAZINE HYDROCHLORIDE 10 MILLIGRAM(S): 1 TABLET, FILM COATED ORAL at 08:16

## 2021-05-22 RX ADMIN — FLUPHENAZINE HYDROCHLORIDE 10 MILLIGRAM(S): 1 TABLET, FILM COATED ORAL at 20:10

## 2021-05-22 RX ADMIN — SERTRALINE 50 MILLIGRAM(S): 25 TABLET, FILM COATED ORAL at 08:16

## 2021-05-22 RX ADMIN — Medication 1 MILLIGRAM(S): at 08:16

## 2021-05-22 RX ADMIN — OLANZAPINE 30 MILLIGRAM(S): 15 TABLET, FILM COATED ORAL at 20:10

## 2021-05-22 RX ADMIN — Medication 2 MILLIGRAM(S): at 20:10

## 2021-05-23 RX ADMIN — FLUPHENAZINE HYDROCHLORIDE 10 MILLIGRAM(S): 1 TABLET, FILM COATED ORAL at 20:11

## 2021-05-23 RX ADMIN — FLUPHENAZINE HYDROCHLORIDE 10 MILLIGRAM(S): 1 TABLET, FILM COATED ORAL at 08:11

## 2021-05-23 RX ADMIN — Medication 1 MILLIGRAM(S): at 08:11

## 2021-05-23 RX ADMIN — Medication 2 MILLIGRAM(S): at 20:10

## 2021-05-23 RX ADMIN — SERTRALINE 50 MILLIGRAM(S): 25 TABLET, FILM COATED ORAL at 08:11

## 2021-05-23 RX ADMIN — OLANZAPINE 30 MILLIGRAM(S): 15 TABLET, FILM COATED ORAL at 20:11

## 2021-05-24 PROCEDURE — 99231 SBSQ HOSP IP/OBS SF/LOW 25: CPT

## 2021-05-24 RX ADMIN — Medication 1 MILLIGRAM(S): at 08:08

## 2021-05-24 RX ADMIN — OLANZAPINE 30 MILLIGRAM(S): 15 TABLET, FILM COATED ORAL at 20:15

## 2021-05-24 RX ADMIN — SERTRALINE 50 MILLIGRAM(S): 25 TABLET, FILM COATED ORAL at 08:08

## 2021-05-24 RX ADMIN — Medication 2 MILLIGRAM(S): at 20:15

## 2021-05-24 RX ADMIN — FLUPHENAZINE HYDROCHLORIDE 10 MILLIGRAM(S): 1 TABLET, FILM COATED ORAL at 08:08

## 2021-05-24 RX ADMIN — FLUPHENAZINE HYDROCHLORIDE 10 MILLIGRAM(S): 1 TABLET, FILM COATED ORAL at 20:15

## 2021-05-24 NOTE — CHART NOTE - NSCHARTNOTEFT_GEN_A_CORE
Social Work Note:    Treatment team met with patient earlier today on unit rounds.  At time of assessment patient endorsed feeling “ok.”  He denied hearing voices.  Per nursing patient has been more visible on the unit and more appropriately engaged.  He has been adherent to prescribed medications.  Suicidal / homicidal ideation denied.      Communication with patient’s mother Constance [phone] ongoing with last contact on 05/20.  At that time she informed that patient now has more frequent contact with her.     Team meeting held last week with patient. At that time goal of making five reality based statements discussed.  Patient was agreeable to this goal.      Last week this worker collaborated with patient's outpatient therapist Clifton Jensen [phone] of [facility].  Communication with this outpatient mental health provider to continue to update outpatient team of patient's clinical status.  Patient is also seen by a psychiatrist at this clinic.     Mental Status Exam:    Mood – Neutral   Sleep – Good   Appetite – Fair   ADLs – Fair   Thought Process – Other   Observation – [id number]    No barriers to discharge identified at this time.     At this time patient is not psychiatrically stable for discharge.

## 2021-05-24 NOTE — PROGRESS NOTE BEHAVIORAL HEALTH - SUMMARY
The patient is a 34yo M, domiciled with mother; employed as a ; self-reported PPHx of depression (per [facility]ES also schizoaffective disorder, bipolar disorder, anxiety), past admissions, denies hx of SIB/SA, hx of aggression; BIB EMS activated by parent; psychiatry consulted for psychosis. Patient was initially admitted to medical floor, where he was evaluated for seizures and workup came back negative. Patient was then admitted to [facility] for acute psychosis with catatonic features.    Patient is more engaged and responsive today with the treatment team than yesterday. He continues to have symptoms of psychosis and would benefit from further inpatient hospitalization for safety and stabilization.    Psychosis with catatonic features:  - Continue Zyprexa Zydis 30mg PO QHS.  - Continue Prolixin 10mg BID, as pt responded to it in the past (increase on 5/13).  - Continue Ativan 1mg PO in the morning, and continue bedtime dose at 2mg for catatonic symptoms.    Depression:   - Continue Zoloft 50mg PO QD.    Agitation:  - Haldol 5mg PO PRN Q6 and Ativan 1mg PO PRN Q6 for agitation not responding to verbal redirection with escalation to IM should the patient refuse/become an acute danger to himself/others. EKG to follow.    Anxiety/Insomnia:  - Atarax 50mg PO QHS for anxiety/insomnia. The patient is a 36yo M, domiciled with mother; employed as a ; self-reported PPHx of depression (per PS[facility]ES also schizoaffective disorder, bipolar disorder, anxiety), past admissions, denies hx of SIB/SA, hx of aggression; BIB EMS activated by parent; psychiatry consulted for psychosis. Patient was initially admitted to medical floor, where he was evaluated for seizures and workup came back negative. Patient was then admitted to [facility] for acute psychosis with catatonic features.    Patient is more engaged and responsive today with the treatment team. He would benefit from further inpatient hospitalization for safety and stabilization while discharge planning is underway.    Psychosis with catatonic features:  - Continue Zyprexa Zydis 30mg PO QHS.  - Continue Prolixin 10mg BID, as pt responded to it in the past (increase on 5/13).  - Continue Ativan 1mg PO in the morning, and continue bedtime dose at 2mg for catatonic symptoms.    Depression:   - Continue Zoloft 50mg PO QD.    Agitation:  - Haldol 5mg PO PRN Q6 and Ativan 1mg PO PRN Q6 for agitation not responding to verbal redirection with escalation to IM should the patient refuse/become an acute danger to himself/others. EKG to follow.    Anxiety/Insomnia:  - Atarax 50mg PO QHS for anxiety/insomnia.

## 2021-05-24 NOTE — PROGRESS NOTE BEHAVIORAL HEALTH - NSBHFUPINTERVALHXFT_PSY_A_CORE
Patient was seen and interviewed this morning. Chart was reviewed and no acute events were reported over the weekend. As per nursing staff, patient has been showing improvement and more visible on the unit.    This morning, Patient was seen and interviewed this morning. Chart was reviewed and no acute events were reported over the weekend. As per nursing staff, patient has been showing improvement and more visible on the unit.    This morning, he is initially sleeping and difficult to wake up, but eventually he wakes up for the interview. He responds that he feels "okay" and is sleeping well through the night. He denies hearing any voices since Thursday. He denies any suicidal/homicidal ideations, or paranoid thoughts. He does not report any side effects from the medications.

## 2021-05-25 PROCEDURE — 99231 SBSQ HOSP IP/OBS SF/LOW 25: CPT

## 2021-05-25 RX ORDER — SERTRALINE 25 MG/1
100 TABLET, FILM COATED ORAL DAILY
Refills: 0 | Status: DISCONTINUED | OUTPATIENT
Start: 2021-05-25 | End: 2021-05-27

## 2021-05-25 RX ADMIN — Medication 2 MILLIGRAM(S): at 20:18

## 2021-05-25 RX ADMIN — OLANZAPINE 30 MILLIGRAM(S): 15 TABLET, FILM COATED ORAL at 20:19

## 2021-05-25 RX ADMIN — SERTRALINE 50 MILLIGRAM(S): 25 TABLET, FILM COATED ORAL at 08:11

## 2021-05-25 RX ADMIN — Medication 1 MILLIGRAM(S): at 08:11

## 2021-05-25 RX ADMIN — FLUPHENAZINE HYDROCHLORIDE 10 MILLIGRAM(S): 1 TABLET, FILM COATED ORAL at 08:11

## 2021-05-25 RX ADMIN — FLUPHENAZINE HYDROCHLORIDE 10 MILLIGRAM(S): 1 TABLET, FILM COATED ORAL at 20:19

## 2021-05-25 NOTE — PROGRESS NOTE BEHAVIORAL HEALTH - SUMMARY
The patient is a 36yo M, domiciled with mother; employed as a ; self-reported PPHx of depression (per PS[facility]ES also schizoaffective disorder, bipolar disorder, anxiety), past admissions, denies hx of SIB/SA, hx of aggression; BIB EMS activated by parent; psychiatry consulted for psychosis. Patient was initially admitted to medical floor, where he was evaluated for seizures and workup came back negative. Patient was then admitted to [facility] for acute psychosis with catatonic features.    Patient is more engaged and responsive today with the treatment team. He would benefit from further inpatient hospitalization for safety and stabilization while discharge planning is underway.    Psychosis with catatonic features:  - Continue Zyprexa Zydis 30mg PO QHS.  - Continue Prolixin 10mg BID, as pt responded to it in the past (increase on 5/13).  - Continue Ativan 1mg PO in the morning, and continue bedtime dose at 2mg for catatonic symptoms.    Depression:   - Continue Zoloft 50mg PO QD.    Agitation:  - Haldol 5mg PO PRN Q6 and Ativan 1mg PO PRN Q6 for agitation not responding to verbal redirection with escalation to IM should the patient refuse/become an acute danger to himself/others. EKG to follow.    Anxiety/Insomnia:  - Atarax 50mg PO QHS for anxiety/insomnia. The patient is a 34yo M, domiciled with mother; employed as a ; self-reported PPHx of depression (per PS[facility]ES also schizoaffective disorder, bipolar disorder, anxiety), past admissions, denies hx of SIB/SA, hx of aggression; BIB EMS activated by parent; psychiatry consulted for psychosis. Patient was initially admitted to medical floor, where he was evaluated for seizures and workup came back negative. Patient was then admitted to [facility] for acute psychosis with catatonic features.    Patient is more engaged and responsive today with the treatment team. He would benefit from further inpatient hospitalization for safety and stabilization while discharge planning is underway.    Psychosis with catatonic features:  - Continue Zyprexa Zydis 30mg PO QHS.  - Continue Prolixin 10mg BID, as pt responded to it in the past (increase on 5/13).  - Continue Ativan 1mg PO in the morning, and continue bedtime dose at 2mg for catatonic symptoms.    Depression:   - Increase Zoloft to 100mg PO QD (5/25/21).    Agitation:  - Haldol 5mg PO PRN Q6 and Ativan 1mg PO PRN Q6 for agitation not responding to verbal redirection with escalation to IM should the patient refuse/become an acute danger to himself/others. EKG to follow.    Anxiety/Insomnia:  - Atarax 50mg PO QHS for anxiety/insomnia.

## 2021-05-25 NOTE — DISCHARGE NOTE BEHAVIORAL HEALTH - MEDICATION SUMMARY - MEDICATIONS TO STOP TAKING
I will STOP taking the medications listed below when I get home from the hospital:    bacitracin 500 units/g topical ointment  -- 1 application on skin 2 times a day

## 2021-05-25 NOTE — DISCHARGE NOTE BEHAVIORAL HEALTH - MEDICATION SUMMARY - MEDICATIONS TO TAKE
I will START or STAY ON the medications listed below when I get home from the hospital:    LORazepam 2 mg oral tablet  -- 1 tab(s) by mouth once a day (at bedtime) x 14 days MDD:3 mg/day  -- Indication: For anxiety    LORazepam 1 mg oral tablet  -- 1 tab(s) by mouth once a day x 14 days MDD:3 mg/day  -- Indication: For anxiety    sertraline 100 mg oral tablet  -- 1 tab(s) by mouth once a day x 14 days   -- Indication: For mood    fluPHENAZine 10 mg oral tablet  -- 1 tab(s) by mouth 2 times a day x 14 days   -- Indication: For Schizophrenia, unspecified type    OLANZapine 15 mg oral tablet, disintegrating  -- 2 tab(s) by mouth once a day (at bedtime) x 14 days   -- Indication: For Schizophrenia, unspecified type

## 2021-05-25 NOTE — DISCHARGE NOTE BEHAVIORAL HEALTH - NSBHDCRESPONSEFT_PSY_A_CORE
Patient has made significant progress over the course of hospitalization. With continuous psychotherapy from the treatment team and medications patient reports feeling better, sleeping, and eating well. Thought process and insight improved. Patient was calm and cooperative and was in good behavioral control. Patient denied any suicidal or homicidal ideations. Patient denied any auditory of visual hallucinations. Patient was more visible on the unit, attending groups. Pt was evaluated by treatment team, pt is stable for discharge and shows no imminent risk to self other or property at this item. Pt family felt comfortable taking pt home, reported that patient appeared to be at baseline and did not have any safety concerns.

## 2021-05-25 NOTE — DISCHARGE NOTE BEHAVIORAL HEALTH - NSBHDCSIGEVENTSFT_PSY_A_CORE
Patient has had episodes of catatonia throughout admission, for which he received IM Ativan with good effect.

## 2021-05-25 NOTE — DISCHARGE NOTE BEHAVIORAL HEALTH - NSBHDCREFERSUBST_PSY_A_CORE
NICU Progress Note     Patient: Abelino Trotter Date: 2018   : 2018  Attending: Carlitos Valentin MD   4 day old male  Admit Date: 2018  Day of Life: 4 days    Patient Active Problem List    Diagnosis Date Noted   • Heart murmur 2018     Priority: Low   •  abstinence syndrome 0-28 days with withdrawal symptoms 2018     Priority: Low       Recent Events: Infant's LARA scores stable on Morphine 0.08mg/kg/dose.  LARA scores 5-7. Taking good volumes.     Vital Last Value 24 Hour Range   Temperature 98.7 °F (37.1 °C) (18 1100) Temp  Min: 98 °F (36.7 °C)  Max: 98.7 °F (37.1 °C)   Pulse 136 (18 1100) Pulse  Min: 110  Max: 160   Respiratory 32 (18 1100) Resp  Min: 32  Max: 58   Non-Invasive  Blood Pressure 77/40 (18 0800) BP  Min: 72/47  Max: 83/37   Pulse Oximetry 96 % (18 1100) SpO2  Min: 96 %  Max: 100 %     Vital Admission Last Value   Weight Weight: 3364 g (Filed from Delivery Summary) (18 0845) 3268 g (18 2300)     Weight    18 2320 18 0200 18 0500 18 2300   Weight: 3250 g 3158 g 3244 g 3268 g     Weight change: 24 g  -3% since birth    Respiratory:   Room Air     Last Apnea/Bradycardia:    79 (18 1613) and intervention:  Self limiting (18 1613)0  Number of Apnea/Bradycardias in previous 24 hours:  0  Number of Apnea/Bradycardias requiring stimulation in previous 24 hours: 0    Fluids:  Source   Rate   Daily Fluid Recieved   Feedings:   Sim Sensitive or EBM, 20 kcal per ounce   N: 100 %        143 mL/kg/day   Total daily fluid     143 mL/kg/day    91 kcal/kg/day      Residuals  0 ml    Emesis previous 24 hours  x 0    Urine output  x 8 Voids    Number of stools previous 24  hours  x 1     Medications:  Current Facility-Administered Medications   Medication   • morphine (1:5)  oral solution 0.24 mg   • lidocaine HCl (XYLOCAINE) 1 % injection 4-10 mg       Physical Exam:   General:  term infant in open  crib.  Skin:  Pink and well perfused.  Slight jaundice noted.  HEENT:  Fontanelles soft, not full or depressed.  normocephalic, anterior fontonelle soft and flat and sutures approximated.  Normal conjunctivae.  Eyes without drainage.  Ears normal.  Cardiovascular:  The precordium is quiet.  Rhythm and rate are regular.  Murmur noted Gr 2/6.  The femoral pulses are equal and palpable.  Capillary refill less than 2 seconds.  Lungs:  Clear to auscultation.  Equal aeration bilaterally.  No retractions.  Abdomen:  Soft, Nontender, No hepatosplenomegaly and Bowel sounds active.  Genitourinary:  Normal male genitalia.  Extremities:  Moves all four extremities.  Equal muscle bulk.  Neurologic:  jittery and hyperresponsive. Tone increased tone.  Suck strong.     Laboratory:   No results found for this or any previous visit (from the past 24 hour(s)).   No results available in last 24 hours      Heme:      Mother's blood type   Information for the patient's mother:  Yusra Trotter [[id number]]   O NEGATIVE                     Infant's blood type O POSITIVE                      Doug test   Lab Results   Component Value Date    DATANTIIGG NEGATIVE 2018                      Bilirubin:     Recent Labs  Lab 18  0505   BILIRUBIN 5.6   DBIL 0.3         Assessment/Impression/Plan:  Baby Boy Yusra Trotter is a former Gestational Age: 39w2d infant now 4 days old with a corrected gestational age of 39w 6d.       Patient Active Problem List   Diagnosis   •  abstinence syndrome 0-28 days with withdrawal symptoms   • Heart murmur       RESPIRATORY:  Assessment:  Stable in room air.  Some tachypnea noted from LARA. Plan: Follow on room air.     CARDIOVASCULAR:  Assessment:  Blood pressure stable.  Murmur present for past few days.  ECHO to be performed this morning.  Plan: Follow blood pressures and continue cardio-respiratory monitoring.  Follow ECHO  results.    FLUIDS-ELECTROLYTES-NUTRITION/GASTROINTESTINAL/GENITOURINARY: Assessment: AGA. Intake has improved over last few days and gained weight last night. Mother breast feeding as able, otherwise taking Sim Sensitive.  Plan: Monitor intake and weight.    INFECTIOUS DISEASE:  Assessment:  Mother Hep C positive. Plan: Will will need follow up at 18 months for Hep C.  To be completed under direction of pediatrician. Continue to monitor for signs and symptoms of infection.      HEMATOLOGIC:  Assessment:  Maternal Blood Type O neg. Infant's Blood Type O pos. TACHO neg. Plan:  Follow bilirubin clinically.     NEUROLOGIC/PAIN:  Assessment:  Maternal methadone use 88 mg daily.  Admitted to the nursing at ~32 hours of life for LARA 9-9-15.  Morphine started at 0.06 mg/kg/dose and was increased to 0.08 mg/kg.  Scores have been stable on that dose for ~ 48 hours Plan: Attempt first wean today and follow tolerance.     SOCIAL:  Mother updated. Father of baby is not involved with mother or pregnancy.  Mother wishes us not to call him. See social work note for further details.     Discharge Planning:    State Screen: 18 ([id number]) (18 0848)   Hearing Screen:  Hearing Test Machine: Auditory Brainstem Response (Algo) (18 1000)   Hearing Test Results: Pass L;Refer R (18 1000)   Congenital Heart Disease Screen: Normal (18 0835)   Circumcision:     Immunizations:   Most Recent Immunizations   Administered Date(s) Administered   • Hep B, adolescent or pediatric 2018      Synagis candidate:     Car Seat Test:     Parental CPR:     Pediatrician: Dr. Mendez    Total Time Spent: 20 Minutes    Patient to be seen by and medical management to be discussed with Dr. Valentin, RN, and mother.    GARY Lopez    Patient to be seen by and medical management to be discussed with Dr. Homero RN, and mother.     Pt seen and examined, agree with above.  T LARA stable on current dose.  Got  echo today for worsening murmur yesterday but murmur is softer today.     General: no apparent distress supine in open crib  HEENT: anterior fontanelle open, soft and flat  Chest: lungs clear to auscultation bilaterally, minimal retractions  Heart: regular rate and rhythm with I/VI systolic murmur, fair cap refill  Abdomen: Soft, nondistended, nontender with good bowel sounds  Extremities: warm and well perfused, good and equal strength and tone     Wean morphine today  F/u echo report - prelim is just PFO  Spoke with mom   no

## 2021-05-25 NOTE — DISCHARGE NOTE BEHAVIORAL HEALTH - PAST PSYCHIATRIC HISTORY
She recaps that patient was diagnosed with mental illness 10yrs ago (heard voices, catatonic). He was stable on Zyprexa 20mg for 10yrs. Over the course of last year, patient had experienced several deaths in the family including his father, his uncle, and his grandmother, which his mother believes precipitated this decompensation that began in February, 2021. He was hospitalized twice at [facility], then discharged (on Invega shot). 1 week ago patient began suddenly pacing nonstop and was not responding to verbal stimuli and would not "snap out of it." At this point patient was readmitted to IPP, now at [facility]. She recaps that patient was diagnosed with mental illness 10yrs ago (heard voices, catatonic). He was stable on Zyprexa 20mg for 10yrs. Over the course of last year, patient had experienced several deaths in the family including his father, his uncle, and his grandmother, which his mother believes precipitated this decompensation that began in February, 2021. He was hospitalized twice at [facility], then discharged (on Invega shot). 1 week ago patient began suddenly pacing nonstop and was not responding to verbal stimuli and would not "snap out of it." At this point patient was readmitted to IPP, now at [facility].    OPD with DR. Mills.  No history of SA.

## 2021-05-25 NOTE — DISCHARGE NOTE BEHAVIORAL HEALTH - NSBHDCMEDSFT_PSY_A_CORE
Patient was restarted on Zyprexa 20mg as this has worked for him in the past. He was titrated up to 30mg daily but continued to report symptoms of AH. Prolixin was added at that point and increased to 10mg BID with good effect. As for the catatonic features, patient was given Ativan multiple times a day and adjusted to 1mg in the morning and 2mg at bedtime with good response. He reported depressed mood during the hospitalization, for which Zoloft 50mg was started and titrated up to 100mg PO daily with good effect. He denied any adverse effects from the medications and showed good compliance.

## 2021-05-25 NOTE — PROGRESS NOTE BEHAVIORAL HEALTH - CASE SUMMARY
Pt was seen, evaluated and discussed with the resident, Dr. Roman. Chart reviewed. The patient is a 34yo M, domiciled with mother; employed as a ; self-reported PPHx of depression (per PSYCKES also schizoaffective disorder, bipolar disorder, anxiety), past admissions, denies hx of SIB/SA, hx of aggression; BIB EMS activated by parent; psychiatry consulted for psychosis. Patient was initially admitted to medical floor, where he was evaluated for seizures and workup came back negative. Patient was then admitted to [facility] for acute psychosis with catatonic features. On evaluation, pt was turning around very slowly, not making direct eye contact with treatment team. Would not answer any questions or follow command, would not open his eyes. Compliant with medications. Agree with assessment and plan above. Continue with medications as above.
as noted
Pt was seen, evaluated and discussed with the resident, Dr. Corcoran. Chart reviewed. The patient is a 35-year-old male; domiciled with mother; employed as a ; self-reported PPHx of depression (per PSYCKES also schizoaffective disorder, bipolar disorder, anxiety), past admissions, denies hx of SIB/SA, hx of aggression; BIB EMS activated by parent; psychiatry consulted for psychosis. Pt was ruled out for seizure disorder and then transferred to [facility] from medical floor. On evaluation, pt is observed to have thought blocking. Gives minimal short answers to questions. Agree with assessment and plan above. Continue with medications as above.
Pt was seen, evaluated and discussed with the resident, Dr. Roman. Chart reviewed. On evaluation, pt reports he is "ok." Has mainly been isolative to his room. No behavioral outbursts. Compliant with medications, denied side effects except states he feels too sedated during the day. Agree with assessment and plan above. Continue with medications as above.
Pt was seen, evaluated and discussed with the resident, Dr. Roman. Chart reviewed. As per nursing staff, pt punched the wall again on Saturday. An indent and some blood stains visible on the wall. On evaluation, pt reported his weekend was uneventful. When asked about the incident, pt minimized it and would not provide a reason for it. Denied hearing any voices, although remains guarded and appears internally preoccupied at times. Reported depressed mood. Agree with assessment and plan above. Continue with medications as above.
Pt was seen, evaluated and discussed with the resident, Dr. Roman. Chart reviewed. On evaluation, pt reports he is "ok." Had an uneventful weekend. As per staff, pt is improving. In good behavioral control. Compliant with medications, denied side effects. States he has been talking to his mother. Denied voices. Agree with assessment and plan above. Continue with medications as above.
Pt was seen, evaluated and discussed with the resident, Dr. Roman. Chart reviewed. On evaluation, pt was sitting in a dark room, starring straight. Pt initially refused morning medication (prolixin and Ativan) 3 times. When asked about this, pt replied that he feels he "doesn't need medication." However, pt also admitted to having intermittent auditory hallucinations however was unable to state when he last heard the voices and what they were telling him. He denied having command type of AH, telling him not to take medication. With much encouragement, pt ended up taking his morning medications at around 12:00PM. Appears internally preoccupied. Agree with assessment and plan above. Continue with medications as above.
as noted
Pt was seen, evaluated and discussed with the resident, Dr. Roman. Chart reviewed. On evaluation, pt continues to endorse intermittent auditory hallucinations of hearing a "conversation.' Denied command type hallucinations. Continues to appear internally preoccupied and exhibiting thought blocking. Compliant with medications, denied side effects. Agree with assessment and plan above. Continue with medications as above.
Pt was seen, evaluated and discussed with the resident, Dr. Roman. Chart reviewed. On evaluation, pt is more responsive in the morning, reports he is "ok." Offered no new complaints. Compliant with medications, denied side effects. Continues to deny voices. Appears down, agreeable to increase Zoloft to 100 mg qdaily. Agree with assessment and plan above. Continue with medications as above.
Pt was seen, evaluated and discussed with the resident, Dr. Roman. Chart reviewed. On evaluation, pt reports he is "ok." States that he did not want to call his mother as he "doesn't want to worry her." Remains bizarre and appears internally preoccupied. States he intermittently hears voices, but denied command type of hallucinations. States he was hearing them earlier, would not describe what they were staying, states "they were talking among themselves." Compliant with medications, denied side effects. Remains isolative to his room, often sitting in the dark. Agree with assessment and plan above. Continue with medications as above.
Pt was seen, evaluated and discussed with the resident, Dr. Roman. Chart reviewed. On evaluation, pt was initially not answering any questions, not able to take his medications or have breakfast. After receiving Ativan 2 mg IM, pt got up, was walking around. Endorsed that the voices are "almost gone." States he is feeling better. Denied side effects of medications. Agree with assessment and plan above. Continue with medications as above.
Pt was seen, evaluated and discussed with the resident, Dr. Roman. Chart reviewed. On evaluation, pt reports that he is doing "ok." Denied hearing any voices and states he has been talking to his mother. COmpliant with medications, denied side effects. Agree with assessment and plan above. Continue with medications as above.

## 2021-05-25 NOTE — DISCHARGE NOTE BEHAVIORAL HEALTH - CARE PROVIDER_API CALL
[facility],   Phone: (   )    -  Fax: (   )    -  Follow Up Time:

## 2021-05-25 NOTE — DISCHARGE NOTE BEHAVIORAL HEALTH - HPI (INCLUDE ILLNESS QUALITY, SEVERITY, DURATION, TIMING, CONTEXT, MODIFYING FACTORS, ASSOCIATED SIGNS AND SYMPTOMS)
HPI from ED behavioral assessment prior to admission to medicine. Objective data and assessment updated.     The patient is a 35-year-old male; domiciled with mother; employed as a ; self-reported PPHx of depression (per PSYCKES also schizoaffective disorder, bipolar disorder, anxiety), past admissions, denies hx of SIB/SA, hx of aggression; BIB EMS activated by parent; psychiatry consulted for psychosis.  Pt states that he was hearing stuff earlier but he "can't tell me too much about them."  He states the voices are male and female but he doesn't know where they are coming from.  He states that they say "good and bad things" but can't elaborate or provide examples.  At times pt with significant speech latency and when asked about it, he states that he is "blacking out, in a deep stare."  Pt reports his mood is "good, could be better," sleep is always poor (5 hours), reports having normal energy, denies anhedonia, reports eating all day (estimates 10 lb weight gain in past few weeks), denies SI/HI.  When asked for collateral contact numbers (psychiatrist and mother), he asks if he has to tell me because he worries for their protection but can't elaborate why.  He then states he will tell me their numbers and then says, "on second thought, I can't."  Pt states he takes 1 pill daily and has been compliant.    Collateral obtained from pt's mother.  she states that pt was hospitalized for about 3 weeks.  He was hearing voices at that time telling him not to eat and he had subsequently lost 20-30 lbs.  Since he was discharged, she has been trying to supplement his diet with electrolyte/nutritional drinks.  She believes he has gained some weight back but pt will not weigh himself.  They saw pt's psychiatrist on Wednesday and he mentioned how he "zones out" and pt's psychiatrist was not overly concerned about this and told collateral that his brain is still healing though she would monitor it with 1-2 more tests (collateral uncertain which tests).  Pt was also started on a risperdal injection but is not due again until May.  She states that pt returned to work for the first time yesterday (after ~6 weeks off).  He works with his brother and the day seemed to go OK but after returning home, he had an acute change.  He "wasn't talking, wasn't listening, wasn't alert, was pacing back and forth, didn't sleep, seemed disoriented, in a trance, zoned out."  She notes that pt was diagnosed with schizophrenia ~10 years ago and had head imaging done a long time ago.  She denies that he has any hx of SI/SA or violence.  Pt has no PMHx, keeps up with ADLs meticulously, no access to guns/weapons.  She states that pt used substances years ago (?percocet for pain) and she believes that is how this all started.  She identifies recent losses (pt's father  last year, then grandmother , then uncle had an accident and ).  There is no known FHx of mental illness.  Pt in outpatient tx at [facility] with "Dr. Mills" and therapist "Son."    INTERVAL:  Copied from : Spoke to outpatient psychiatrist, Dr. Mills. Last saw patient . On Wellbutrin XL 300mg daily and Invega Sustenna 234mg IM, due for next dose May 7. On evaluation seemed to be doing well overall, some AH, no SI/HI. However did seem to have moments of blank starring, concerning for "absence seizure" and does have a history of catatonia especially if AH are telling him not to drink, not to eat, not to sleep. States that she did a utox on the  and it was negative. Psychiatrist does express concerns regarding blank stares and believes that they have gotten worse-- spoke to mother recently. Agrees with admission to hospital.     Copied from : Spoke to brother, Rommel . States that patient was doing better since discharge from [facility], however on Monday had acute onset of blank stares, inability to engage in any conversation, and slowness. Brother is also concerned because mother describes moments at home to him when he "freezes" both in speech and physically, "he has to be pushed to move to another place." Brother states that patient has a history of catatonia. Denies pt having a history of head injury or seizure.    From today 5/1: Patient seen this morning and was observed gazing out the window. Patient initially denied any concerning thoughts, but later stated that he does hear voices and there are a lot of thoughts going through his mind. Patient reports that he is hearing a familiar voice, but cannot decipher who it is. He states that sometimes it's incomprehensible and sometimes it is giving orders, but not currently. Patient with difficulty identifying specific thoughts. Denies wanting to die or kill himself. Patient was admitted to medicine for medical workup after his psychiatrist expressed concern for absence seizure. No evidence of seizures and patient was discharged to [facility] for treatment of psychosis.

## 2021-05-25 NOTE — DISCHARGE NOTE BEHAVIORAL HEALTH - MEDICATION SUMMARY - MEDICATIONS TO CHANGE
I will SWITCH the dose or number of times a day I take the medications listed below when I get home from the hospital:    OLANZapine 20 mg oral tablet, disintegrating  -- 1 tab(s) by mouth once a day

## 2021-05-25 NOTE — DISCHARGE NOTE BEHAVIORAL HEALTH - NSBHDCSUBSTHXFT_PSY_A_CORE
Substance use - patient has been smoking cigarettes for years; stopped after most recent IPP admission a couple weeks ago.

## 2021-05-25 NOTE — DISCHARGE NOTE BEHAVIORAL HEALTH - NSBHDCSWCOMMENTSFT_PSY_A_CORE
Discharge to [email or web address] on [email or web address] to emailed to [email or web address] on 05/27/2021 at 12:45pm.

## 2021-05-25 NOTE — PROGRESS NOTE BEHAVIORAL HEALTH - MODIFICATIONS
None
None
seen/discussed with resident.  Will continue meds and treatment plan. Pt is not yet stable for d/c
None
seen/discussed with resident. Will continue meds and to monitor.  Continue to encourage pt to be forthcoming with sx .
None

## 2021-05-25 NOTE — PROGRESS NOTE BEHAVIORAL HEALTH - NSBHFUPINTERVALHXFT_PSY_A_CORE
Patient was seen and interviewed today. Chart was reviewed and no acute events were reported overnight. As per nursing staff, patient has been a little better but still needs some prompting. He continues to take his medications regularly, denying any adverse side effects.    Today, he awakes easily as treatment team enters his room. He reports feeling "fine" and looks more energized and engaged today compared to prior days. He endorses sleeping and eating well. He denies hearing any voices since last Thursday when he told the team. He continues to call his mother regularly and is looking forward to discharge and going to their house [facility]. He denies any suicidal/homicidal ideations or paranoid thoughts. Patient was seen and interviewed today. Chart was reviewed and no acute events were reported overnight. As per nursing staff, patient has been more visible on the unit, specifically at nighttime, but still needs some prompting at times. He continues to take his medications regularly, denying any adverse side effects.    Today, he awakes easily as treatment team enters his room. He reports feeling "fine" and looks more energized and engaged today compared to prior days. He endorses sleeping and eating well. He denies hearing any voices since last Thursday when he told the team. He continues to call his mother regularly and is looking forward to discharge and going to their house [facility]. He denies any suicidal/homicidal ideations or paranoid thoughts.

## 2021-05-26 VITALS
DIASTOLIC BLOOD PRESSURE: 78 MMHG | HEART RATE: 80 BPM | RESPIRATION RATE: 16 BRPM | SYSTOLIC BLOOD PRESSURE: 124 MMHG | TEMPERATURE: 97 F

## 2021-05-26 LAB — SARS-COV-2 RNA SPEC QL NAA+PROBE: SIGNIFICANT CHANGE UP

## 2021-05-26 PROCEDURE — 99231 SBSQ HOSP IP/OBS SF/LOW 25: CPT

## 2021-05-26 RX ORDER — FLUPHENAZINE HYDROCHLORIDE 1 MG/1
1 TABLET, FILM COATED ORAL
Qty: 28 | Refills: 0
Start: 2021-05-26 | End: 2021-06-08

## 2021-05-26 RX ORDER — SERTRALINE 25 MG/1
1 TABLET, FILM COATED ORAL
Qty: 14 | Refills: 0
Start: 2021-05-26 | End: 2021-06-08

## 2021-05-26 RX ORDER — OLANZAPINE 15 MG/1
2 TABLET, FILM COATED ORAL
Qty: 28 | Refills: 0
Start: 2021-05-26 | End: 2021-06-08

## 2021-05-26 RX ADMIN — OLANZAPINE 30 MILLIGRAM(S): 15 TABLET, FILM COATED ORAL at 20:16

## 2021-05-26 RX ADMIN — Medication 2 MILLIGRAM(S): at 20:18

## 2021-05-26 RX ADMIN — SERTRALINE 100 MILLIGRAM(S): 25 TABLET, FILM COATED ORAL at 08:26

## 2021-05-26 RX ADMIN — FLUPHENAZINE HYDROCHLORIDE 10 MILLIGRAM(S): 1 TABLET, FILM COATED ORAL at 20:16

## 2021-05-26 RX ADMIN — Medication 1 MILLIGRAM(S): at 08:26

## 2021-05-26 RX ADMIN — FLUPHENAZINE HYDROCHLORIDE 10 MILLIGRAM(S): 1 TABLET, FILM COATED ORAL at 08:26

## 2021-05-26 NOTE — PROGRESS NOTE BEHAVIORAL HEALTH - NSBHPTASSESSDT_PSY_A_CORE
05-May-2021 09:41
20-May-2021 16:28
13-May-2021 11:15
19-May-2021 13:21
12-May-2021 09:57
04-May-2021 09:25
03-May-2021 12:07
06-May-2021 11:09
17-May-2021 14:43
11-May-2021 13:55
18-May-2021 10:43
24-May-2021 12:31
02-May-2021 14:27
25-May-2021 12:14
21-May-2021 14:22
26-May-2021 10:02
14-May-2021 09:25
07-May-2021 09:41
10-May-2021 12:39

## 2021-05-26 NOTE — PROGRESS NOTE BEHAVIORAL HEALTH - NSBHCHARTREVIEWVS_PSY_A_CORE FT
ICU Vital Signs Last 24 Hrs  T(C): 36.4 (12 May 2021 07:34), Max: 36.4 (12 May 2021 07:34)  T(F): 97.6 (12 May 2021 07:34), Max: 97.6 (12 May 2021 07:34)  HR: 79 (12 May 2021 07:34) (77 - 79)  BP: 125/82 (12 May 2021 07:34) (125/82 - 160/79)  BP(mean): --  ABP: --  ABP(mean): --  RR: 16 (12 May 2021 07:34) (16 - 18)  SpO2: --
Vital Signs Last 24 Hrs  T(C): 36.3 (11 May 2021 06:27), Max: 36.3 (11 May 2021 06:27)  T(F): 97.3 (11 May 2021 06:27), Max: 97.3 (11 May 2021 06:27)  HR: 62 (11 May 2021 06:27) (62 - 100)  BP: 135/69 (11 May 2021 06:27) (135/69 - 145/69)  BP(mean): --  RR: 18 (11 May 2021 06:27) (16 - 18)  SpO2: --
Vital Signs Last 24 Hrs  T(C): 36 (12 May 2021 17:00), Max: 36 (12 May 2021 17:00)  T(F): 96.8 (12 May 2021 17:00), Max: 96.8 (12 May 2021 17:00)  HR: 104 (12 May 2021 17:00) (104 - 104)  BP: 130/63 (12 May 2021 17:00) (130/63 - 130/63)  BP(mean): --  RR: 16 (12 May 2021 17:00) (16 - 16)  SpO2: --
Vital Signs Last 24 Hrs  T(C): 36.6 (20 May 2021 16:05), Max: 36.6 (20 May 2021 16:05)  T(F): 97.8 (20 May 2021 16:05), Max: 97.8 (20 May 2021 16:05)  HR: 72 (20 May 2021 16:05) (72 - 72)  BP: 151/78 (20 May 2021 16:05) (151/78 - 151/78)  BP(mean): --  RR: 16 (20 May 2021 16:05) (16 - 16)  SpO2: --
Vital Signs Last 24 Hrs  T(C): 35.9 (03 May 2021 09:06), Max: 35.9 (03 May 2021 09:06)  T(F): 96.7 (03 May 2021 09:06), Max: 96.7 (03 May 2021 09:06)  HR: 84 (03 May 2021 09:06) (84 - 86)  BP: 108/74 (03 May 2021 09:06) (108/74 - 132/65)  BP(mean): --  RR: 16 (03 May 2021 09:06) (16 - 18)  SpO2: --
Vital Signs Last 24 Hrs  T(C): 36.5 (16 May 2021 15:37), Max: 36.5 (16 May 2021 15:37)  T(F): 97.7 (16 May 2021 15:37), Max: 97.7 (16 May 2021 15:37)  HR: 91 (16 May 2021 15:37) (91 - 91)  BP: 143/75 (16 May 2021 15:37) (143/75 - 143/75)  BP(mean): --  RR: 18 (16 May 2021 15:37) (18 - 18)  SpO2: --
Vital Signs Last 24 Hrs  T(C): 36.6 (20 May 2021 16:05), Max: 36.8 (19 May 2021 16:31)  T(F): 97.8 (20 May 2021 16:05), Max: 98.2 (19 May 2021 16:31)  HR: 72 (20 May 2021 16:05) (72 - 79)  BP: 151/78 (20 May 2021 16:05) (140/74 - 151/78)  BP(mean): --  RR: 16 (20 May 2021 16:05) (16 - 16)  SpO2: --
Vital Signs Last 24 Hrs  T(C): 36.4 (05 May 2021 19:19), Max: 36.4 (05 May 2021 19:19)  T(F): 97.6 (05 May 2021 19:19), Max: 97.6 (05 May 2021 19:19)  HR: 86 (05 May 2021 19:19) (86 - 86)  BP: 135/85 (05 May 2021 19:19) (135/85 - 135/85)  BP(mean): --  RR: 16 (05 May 2021 19:19) (16 - 16)  SpO2: --
Vital Signs Last 24 Hrs  T(C): 36.4 (23 May 2021 18:07), Max: 36.4 (23 May 2021 18:07)  T(F): 97.6 (23 May 2021 18:07), Max: 97.6 (23 May 2021 18:07)  HR: 86 (23 May 2021 18:07) (86 - 86)  BP: 136/74 (23 May 2021 18:07) (136/74 - 136/74)  BP(mean): --  RR: 18 (23 May 2021 18:07) (18 - 18)  SpO2: --
Vital Signs Last 24 Hrs  T(C): 35.1 (25 May 2021 08:31), Max: 36.5 (24 May 2021 15:30)  T(F): 95.1 (25 May 2021 08:31), Max: 97.7 (24 May 2021 15:30)  HR: 76 (25 May 2021 08:31) (62 - 76)  BP: 124/83 (25 May 2021 08:31) (124/83 - 151/91)  BP(mean): --  RR: 16 (25 May 2021 08:31) (16 - 18)  SpO2: --
Vital Signs Last 24 Hrs  T(C): 35.9 (17 May 2021 16:11), Max: 35.9 (17 May 2021 16:11)  T(F): 96.7 (17 May 2021 16:11), Max: 96.7 (17 May 2021 16:11)  HR: 93 (17 May 2021 16:11) (93 - 93)  BP: 122/76 (17 May 2021 16:11) (122/76 - 122/76)  BP(mean): --  RR: 19 (17 May 2021 16:11) (19 - 19)  SpO2: --
ICU Vital Signs Last 24 Hrs  T(C): 36.6 (04 May 2021 16:11), Max: 36.6 (04 May 2021 16:11)  T(F): 97.9 (04 May 2021 16:11), Max: 97.9 (04 May 2021 16:11)  HR: 89 (04 May 2021 16:11) (89 - 89)  BP: 126/66 (04 May 2021 16:11) (126/66 - 126/66)  BP(mean): --  ABP: --  ABP(mean): --  RR: 16 (04 May 2021 16:11) (16 - 16)  SpO2: --
ICU Vital Signs Last 24 Hrs  T(C): 36.6 (25 May 2021 15:51), Max: 36.6 (25 May 2021 15:51)  T(F): 97.8 (25 May 2021 15:51), Max: 97.8 (25 May 2021 15:51)  HR: 88 (25 May 2021 15:51) (88 - 88)  BP: 130/80 (25 May 2021 15:51) (130/80 - 130/80)  BP(mean): --  ABP: --  ABP(mean): --  RR: 18 (25 May 2021 15:51) (18 - 18)  SpO2: --
ICU Vital Signs Last 24 Hrs  T(C): 36.1 (07 May 2021 09:04), Max: 36.3 (06 May 2021 19:16)  T(F): 97 (07 May 2021 09:04), Max: 97.3 (06 May 2021 19:16)  HR: 70 (07 May 2021 09:04) (70 - 86)  BP: 158/79 (07 May 2021 09:04) (130/81 - 158/79)  BP(mean): --  ABP: --  ABP(mean): --  RR: 18 (07 May 2021 09:04) (18 - 18)  SpO2: --
Vital Signs Last 24 Hrs  T(C): 35.8 (10 May 2021 06:19), Max: 35.8 (10 May 2021 06:19)  T(F): 96.4 (10 May 2021 06:19), Max: 96.4 (10 May 2021 06:19)  HR: 66 (10 May 2021 06:19) (66 - 66)  BP: 133/72 (10 May 2021 06:19) (133/72 - 133/72)  BP(mean): --  RR: 20 (10 May 2021 06:19) (20 - 20)  SpO2: --
Vital Signs Last 24 Hrs  T(C): 35.6 (01 May 2021 13:15), Max: 36.6 (01 May 2021 05:56)  T(F): 96.1 (01 May 2021 13:15), Max: 97.8 (01 May 2021 05:56)  HR: 86 (01 May 2021 13:15) (80 - 86)  BP: 151/90 (01 May 2021 13:15) (111/60 - 151/90)  BP(mean): --  RR: 18 (01 May 2021 13:15) (18 - 19)  SpO2: --

## 2021-05-26 NOTE — PROGRESS NOTE BEHAVIORAL HEALTH - NSBHFUPINTERVALCCFT_PSY_A_CORE
"I'm doing fine."
(mute)
Pt is isolative but without c/o. Mood is neutral; no behavior issues.  No overt psychosis.  No reported thoughts to harm self
no reply
none
no reply
"I'm tired and depressed."
"I'm tired."
"I'm okay."
"I'm fine."
"I'm doing fine."
"I'm fine."
"I'm fine."
"I'm good"
"I'm fine, how are you?"
"I'm ok"
"I'm okay"
"I'm ok"
"Ok"

## 2021-05-26 NOTE — PROGRESS NOTE BEHAVIORAL HEALTH - ABNORMAL MOVEMENTS
No abnormal movements
Other
No abnormal movements
Other

## 2021-05-26 NOTE — PROGRESS NOTE BEHAVIORAL HEALTH - AFFECT CONGRUENCE
Congruent
Not congruent
Not congruent
Congruent
Not congruent
Congruent
Congruent
Not congruent
Congruent

## 2021-05-26 NOTE — PROGRESS NOTE BEHAVIORAL HEALTH - NS ED BHA AXIS I PRIMARY CODE FT
F20.9

## 2021-05-26 NOTE — PROGRESS NOTE BEHAVIORAL HEALTH - SECONDARY DX1
Catatonia

## 2021-05-26 NOTE — PROGRESS NOTE BEHAVIORAL HEALTH - MUSCLE TONE / STRENGTH
Other
Normal muscle tone/strength
Other

## 2021-05-26 NOTE — PROGRESS NOTE BEHAVIORAL HEALTH - NSBHATTESTSEENBY_PSY_A_CORE
attending Psychiatrist without NP/Trainee
attending Psychiatrist without NP/Trainee
Attending Psychiatrist supervising NP/Trainee, meeting pt...
attending Psychiatrist without NP/Trainee
attending Psychiatrist without NP/Trainee
Attending Psychiatrist supervising NP/Trainee, meeting pt...
attending Psychiatrist without NP/Trainee
Attending Psychiatrist supervising NP/Trainee, meeting pt...
attending Psychiatrist without NP/Trainee
Attending Psychiatrist supervising NP/Trainee, meeting pt...

## 2021-05-26 NOTE — PROGRESS NOTE BEHAVIORAL HEALTH - NS ED BHA MSE SPEECH VOLUME
Other
Other
Normal
Other
Normal
Soft
Normal
Normal
Other
Other

## 2021-05-26 NOTE — PROGRESS NOTE BEHAVIORAL HEALTH - PRIMARY DX
Schizophrenia, unspecified type

## 2021-05-26 NOTE — PROGRESS NOTE BEHAVIORAL HEALTH - GAIT / STATION
Normal gait / station
Other
Other
Normal gait / station
Other
Normal gait / station
Other

## 2021-05-26 NOTE — PROGRESS NOTE BEHAVIORAL HEALTH - NSBHADMITIPOBSFT_PSY_A_CORE
as per nursing
protocol
as indicated
protocol

## 2021-05-26 NOTE — PROGRESS NOTE BEHAVIORAL HEALTH - THOUGHT PROCESS
Linear
Other
Linear
Other
Linear
Thought blocking
Linear
Other
Linear
Linear/Thought blocking
Other
Linear/Thought blocking
Linear
Linear/Thought blocking

## 2021-05-26 NOTE — PROGRESS NOTE BEHAVIORAL HEALTH - SUMMARY
The patient is a 36yo M, domiciled with mother; employed as a ; self-reported PPHx of depression (per [facility] also schizoaffective disorder, bipolar disorder, anxiety), past admissions, denies hx of SIB/SA, hx of aggression; BIB EMS activated by parent; psychiatry consulted for psychosis. Patient was initially admitted to medical floor, where he was evaluated for seizures and workup came back negative. Patient was then admitted to [facility] for acute psychosis with catatonic features.    Patient is more engaged and responsive today with the treatment team. He would benefit from further inpatient hospitalization for safety and stabilization while discharge planning is underway.    Psychosis with catatonic features:  - Continue Zyprexa Zydis 30mg PO QHS.  - Continue Prolixin 10mg BID, as pt responded to it in the past (increase on 5/13).  - Continue Ativan 1mg PO in the morning, and continue bedtime dose at 2mg for catatonic symptoms.    Depression:   - Continue Zoloft 100mg PO QD (5/25/21).    Agitation:  - Haldol 5mg PO PRN Q6 and Ativan 1mg PO PRN Q6 for agitation not responding to verbal redirection with escalation to IM should the patient refuse/become an acute danger to himself/others. EKG to follow.    Anxiety/Insomnia:  - Atarax 50mg PO QHS for anxiety/insomnia.

## 2021-05-26 NOTE — PROGRESS NOTE BEHAVIORAL HEALTH - NSBHFUPINTERVALHXFT_PSY_A_CORE
Pt was seen, evaluated, chart reviewed. As per nursing staff, no events overnight. On evaluation, pt was up, out of bed, having breakfast in his room. Reports that he is feeling "good" and appears to have a brighter affect. Is compliant with medication, denies negative side effects. Endorsed good sleep and appetite. Denied auditory/visual hallucinations. Denied paranoia. Denied suicidal/homicidal ideation, intent or plan. Feels he is ready to go home, future oriented about taking a vacation with family and going back to work. Pt was seen, evaluated, chart reviewed. As per nursing staff, no events overnight. On evaluation, pt was up, out of bed, having breakfast in his room. Reports that he is feeling "good" and appears to have a brighter affect. Is compliant with medication, denies negative side effects. Endorsed good sleep and appetite. Denied auditory/visual hallucinations. Denied paranoia. Denied suicidal/homicidal ideation, intent or plan. Feels he is ready to go home, future oriented about taking a vacation with family and going back to work.    As per pt's mother, Constance, she feels safe with pt coming home tomorrow, no safety concerns verbalized. She will make sure pt's brother will pick him up from the hospital tomorrow.

## 2021-05-26 NOTE — PROGRESS NOTE BEHAVIORAL HEALTH - NSBHADMITIPOBS_PSY_A_CORE
Routine observation
Enhanced supervision
Routine observation

## 2021-05-26 NOTE — PROGRESS NOTE BEHAVIORAL HEALTH - RISK ASSESSMENT
Risk factors include current psychotic symptoms and AH, male gender, substance use  Protective factors- no history of suicide attempts, has employment, residential stability, good support system, history of responding and being compliant with treatment.
Risk factors include current psychotic symptoms and AH, male gender, substance use  Protective factors- no history of suicide attempts, has employment, residential stability, good support system, history of responding and being compliant with treatment
Risk factors include current psychotic symptoms and AH, male gender, substance use  Protective factors- no history of suicide attempts, has employment, residential stability, good support system, history of responding and being compliant with treatment.
Risk factors include current psychotic symptoms and AH, male gender, substance use  Protective factors- no history of suicide attempts, has employment, residential stability, good support system, history of responding and being compliant with treatment
Risk factors include current psychotic symptoms and AH, male gender, substance use  Protective factors- no history of suicide attempts, has employment, residential stability, good support system, history of responding and being compliant with treatment.
Risk factors include current psychotic symptoms and AH, male gender, substance use  Protective factors- no history of suicide attempts, has employment, residential stability, good support system, history of responding and being compliant with treatment.
Risk factors include current psychotic symptoms and AH, male gender, substance use  Protective factors- no history of suicide attempts, has employment, residential stability, good support system, history of responding and being compliant with treatment
Risk factors include current psychotic symptoms and AH, male gender, substance use  Protective factors- no history of suicide attempts, has employment, residential stability, good support system, history of responding and being compliant with treatment.
Risk factors include current psychotic symptoms and AH, male gender, substance use  Protective factors- no history of suicide attempts, has employment, residential stability, good support system, history of responding and being compliant with treatment.
Risk factors include current psychotic symptoms and AH, male gender, substance use  Protective factors- no history of suicide attempts, has employment, residential stability, good support system, history of responding and being compliant with treatment
Risk factors include current psychotic symptoms and AH, male gender, substance use  Protective factors- no history of suicide attempts, has employment, residential stability, good support system, history of responding and being compliant with treatment
Risk factors include current psychotic symptoms and AH, male gender, substance use  Protective factors- no history of suicide attempts, has employment, residential stability, good support system, history of responding and being compliant with treatment.

## 2021-05-26 NOTE — PROGRESS NOTE BEHAVIORAL HEALTH - EYE CONTACT
Fair
Poor
Fair
Poor/Other
Poor/Other
Poor
Fair

## 2021-05-26 NOTE — PROGRESS NOTE BEHAVIORAL HEALTH - NSBHADMITDANGERSELF_PSY_A_CORE
unable to care for self

## 2021-05-26 NOTE — CHART NOTE - NSCHARTNOTEFT_GEN_A_CORE
Social Work Note:    Treatment team met with patient earlier this morning on unit rounds.  At time of assessment patient was in his room.  He was out of bed and finishing his breakfast.  On interview patient was oriented x4.  Speech was clear.  Thought process linear.  Patient was pleasant on interview and expressed feeling much better.  Audio hallucinations denied.  Suicidal / homicidal ideation denied.  Patient has been adherent to prescribed medications.  He has been in contact with his mother.     Discharge plan discussed with patient.  Prior to admission patient was in treatment at [facility].  This worker spoke with patient’s therapist Clifton yesterday to schedule appointment for patient to be seen by his therapist and psychiatrist.      Patient is anticipated for discharge tomorrow - Thursday May 27.

## 2021-05-26 NOTE — PROGRESS NOTE BEHAVIORAL HEALTH - BODY HABITUS
Average build

## 2021-05-26 NOTE — BH SAFETY PLAN - STEP 6 SAFE ENVIRONMENT
One way I would make my environment safe would be talk to someone let them know what is going on and how I feel.

## 2021-05-26 NOTE — PROGRESS NOTE BEHAVIORAL HEALTH - NSBHADMITMEDEDU_PSY_A_CORE
no
yes...

## 2021-05-26 NOTE — PROGRESS NOTE BEHAVIORAL HEALTH - ATTENTION / CONCENTRATION
Normal
Other
Normal

## 2021-05-26 NOTE — BH SAFETY PLAN - PHONE
[facility] CALLED IN REFILL FOR LISINOPRIL 20MG (90 DAY SUPPLY) [facility]  [phone]

## 2021-05-26 NOTE — PROGRESS NOTE BEHAVIORAL HEALTH - NSBHFUPSUICINTERVAL_PSY_A_CORE
none known
unable to assess
none known
unable to assess
none known

## 2021-05-26 NOTE — PROGRESS NOTE BEHAVIORAL HEALTH - NSBHFUPVIOL_PSY_A_CORE
none known
unable to assess
unable to assess
none known

## 2021-05-26 NOTE — PROGRESS NOTE BEHAVIORAL HEALTH - NS ED BHA MSE GENERAL APPEARANCE
No deformities present

## 2021-05-27 PROCEDURE — 99238 HOSP IP/OBS DSCHRG MGMT 30/<: CPT

## 2021-05-27 RX ORDER — OLANZAPINE 15 MG/1
2 TABLET, FILM COATED ORAL
Qty: 28 | Refills: 0
Start: 2021-05-27 | End: 2021-06-09

## 2021-05-27 RX ADMIN — FLUPHENAZINE HYDROCHLORIDE 10 MILLIGRAM(S): 1 TABLET, FILM COATED ORAL at 09:13

## 2021-05-27 RX ADMIN — OLANZAPINE 30 MILLIGRAM(S): 15 TABLET, FILM COATED ORAL at 17:23

## 2021-05-27 RX ADMIN — FLUPHENAZINE HYDROCHLORIDE 10 MILLIGRAM(S): 1 TABLET, FILM COATED ORAL at 17:23

## 2021-05-27 RX ADMIN — SERTRALINE 100 MILLIGRAM(S): 25 TABLET, FILM COATED ORAL at 09:13

## 2021-05-27 RX ADMIN — Medication 2 MILLIGRAM(S): at 17:23

## 2021-05-27 RX ADMIN — Medication 1 MILLIGRAM(S): at 09:13

## 2021-05-27 NOTE — CHART NOTE - NSCHARTNOTEFT_GEN_A_CORE
Pt was seen, evaluated, chart reviewed.  As per nursing staff, no events overnight. On evaluation, pt reports he is doing well. Offered no new complaints. Reports he feels ready to go home. Is compliant with medication, denies negative side effects. Endorsed good sleep and appetite. Denied auditory/visual hallucinations. Denied paranoia. Denied suicidal/homicidal ideation, intent or plan.     Pt is for discharge today. Agreeable with outpatient follow up.

## 2021-05-27 NOTE — CHART NOTE - NSCHARTNOTEFT_GEN_A_CORE
Social Work Discharge Note:    Patient is for discharge today.   She is alert and oriented x3.  Mood is improved.  Anxiety has decreased.  Insight and judgment have improved.  Suicidal / homicidal ideation denied.      Patient will be discharged to his home in [place] with his mother.  Plan is for patient to resume outpatient mental health treatment at [facility].  Patient is aware and agreeable to same.      [facility], [facility] [phone] provided as an additional resource.     Contact with patient’s mother made prior to discharge.  At that time no concerns were verbalized regarding patient’s return to the community.    Patient is aware and agreeable to discharge today.

## 2021-05-27 NOTE — CHART NOTE - NSCHARTNOTESELECT_GEN_ALL_CORE
Event Note
attending note/Event Note

## 2021-06-02 DIAGNOSIS — G47.00 INSOMNIA, UNSPECIFIED: ICD-10-CM

## 2021-06-02 DIAGNOSIS — F32.9 MAJOR DEPRESSIVE DISORDER, SINGLE EPISODE, UNSPECIFIED: ICD-10-CM

## 2021-06-02 DIAGNOSIS — F41.9 ANXIETY DISORDER, UNSPECIFIED: ICD-10-CM

## 2021-06-02 DIAGNOSIS — F31.9 BIPOLAR DISORDER, UNSPECIFIED: ICD-10-CM

## 2021-06-02 DIAGNOSIS — F20.2 CATATONIC SCHIZOPHRENIA: ICD-10-CM

## 2021-06-09 NOTE — ED BEHAVIORAL HEALTH ASSESSMENT NOTE - AFFECT QUALITY
Depressed Libtayo Counseling- I discussed with the patient the risks of Libtayo including but not limited to nausea, vomiting, diarrhea, and bone or muscle pain.  The patient verbalized understanding of the proper use and possible adverse effects of Libtayo.  All of the patient's questions and concerns were addressed.

## 2021-09-24 NOTE — PROGRESS NOTE BEHAVIORAL HEALTH - RELATEDNESS
Discharge Planner Post-Acute Rehab SLP:      Discharge Plan:home with assist, likely further SLP     Precautions:fall, swallowing     Current Status:  Communication: to be assessed, pt limited voicing/aphonic, follows simple directions, some mild aphasia/dysarthria baseline per son, worsened now  Cognition: to be assessed  Swallow: IDDSI 4,- pureed  mildly thick fluids (2),  1:1 for po/meals, assist to feed PRN, slow pace, small bites, no straw, VFSS PRN (order received, on hold)     Assessment: Pt seen for dysphagia therapy and speech-language therapy. RN and daughter reported some signs of aspiration following mildly thick liquid and after meals. RN administered meds crushed in puree and in mildly thick liquid. Pt also accepted additional sips of mildly thick liquid via cup. Suspect frequently delayed swallow. No cough, throat clear, wet vocal quality, eye watering, or increased WOB observed. Educated pt/daughter re: aspiration precautions and to administer liquid via tsp if s/s aspiration occur. Pt responded to basic yes/no questions with 95% accuracy with minimal cues. Unable to produce voice (state name or /a/) with maximal cues/cues for deep breath and vocalization.    Other Barriers to Discharge (Family Training, etc): TBD  
Fair
Fair
Poor

## 2022-11-07 NOTE — PATIENT PROFILE ADULT - NSPROPTRIGHTNOTIFYOBTAINDET_GEN_A_NUR
patient catatonic unable to answer Consent (Temporal Branch)/Introductory Paragraph: The rationale for Mohs was explained to the patient and consent was obtained. The risks, benefits and alternatives to therapy were discussed in detail. Specifically, the risks of damage to the temporal branch of the facial nerve, infection, scarring, bleeding, prolonged wound healing, incomplete removal, allergy to anesthesia, and recurrence were addressed. Prior to the procedure, the treatment site was clearly identified and confirmed by the patient. All components of Universal Protocol/PAUSE Rule completed.

## 2023-08-04 NOTE — BEHAVIORAL HEALTH ASSESSMENT NOTE - ABUSE / TRAUMA HISTORY
Additional Notes: S/p arthropod bites and pt scratching. Discussed use of hydroquinone with patient and patients parent to affected areas for 3months.
Render Risk Assessment In Note?: no
Detail Level: Simple
No

## 2023-11-07 NOTE — PROGRESS NOTE BEHAVIORAL HEALTH - NSBHFUPTYPE_PSY_A_CORE
Inpatient
Complex Repair And O-T Advancement Flap Text: The defect edges were debeveled with a #15 scalpel blade.  The primary defect was closed partially with a complex linear closure.  Given the location of the remaining defect, shape of the defect and the proximity to free margins an O-T advancement flap was deemed most appropriate for complete closure of the defect.  Using a sterile surgical marker, an appropriate advancement flap was drawn incorporating the defect and placing the expected incisions within the relaxed skin tension lines where possible. The area thus outlined was incised deep to adipose tissue with a #15 scalpel blade. The skin margins were undermined to an appropriate distance in all directions utilizing iris scissors.

## 2024-02-13 NOTE — PATIENT PROFILE BEHAVIORAL HEALTH - NSDASASENSITIVE_PSY_ALL_CORE
Patient informed and is appreciative. She will  abx today.   Verbalized understanding of plan.     Nothing further needed at this time.    No